# Patient Record
Sex: MALE | Race: WHITE | URBAN - METROPOLITAN AREA
[De-identification: names, ages, dates, MRNs, and addresses within clinical notes are randomized per-mention and may not be internally consistent; named-entity substitution may affect disease eponyms.]

---

## 2017-01-01 ENCOUNTER — TRANSFERRED RECORDS (OUTPATIENT)
Dept: HEALTH INFORMATION MANAGEMENT | Facility: CLINIC | Age: 67
End: 2017-01-01

## 2017-01-01 ENCOUNTER — PRE VISIT (OUTPATIENT)
Dept: UROLOGY | Facility: CLINIC | Age: 67
End: 2017-01-01

## 2017-01-01 ENCOUNTER — PRE VISIT (OUTPATIENT)
Dept: CARDIOLOGY | Facility: CLINIC | Age: 67
End: 2017-01-01

## 2017-01-01 ENCOUNTER — COMMITTEE REVIEW (OUTPATIENT)
Dept: TRANSPLANT | Facility: CLINIC | Age: 67
End: 2017-01-01

## 2017-01-01 ENCOUNTER — OFFICE VISIT (OUTPATIENT)
Dept: SLEEP MEDICINE | Facility: CLINIC | Age: 67
End: 2017-01-01
Attending: INTERNAL MEDICINE
Payer: COMMERCIAL

## 2017-01-01 ENCOUNTER — OFFICE VISIT (OUTPATIENT)
Dept: TRANSPLANT | Facility: CLINIC | Age: 67
End: 2017-01-01
Attending: TRANSPLANT SURGERY
Payer: COMMERCIAL

## 2017-01-01 ENCOUNTER — TELEPHONE (OUTPATIENT)
Dept: TRANSPLANT | Facility: CLINIC | Age: 67
End: 2017-01-01

## 2017-01-01 ENCOUNTER — RADIANT APPOINTMENT (OUTPATIENT)
Dept: GENERAL RADIOLOGY | Facility: CLINIC | Age: 67
End: 2017-01-01
Attending: INTERNAL MEDICINE
Payer: COMMERCIAL

## 2017-01-01 ENCOUNTER — MEDICAL CORRESPONDENCE (OUTPATIENT)
Dept: HEALTH INFORMATION MANAGEMENT | Facility: CLINIC | Age: 67
End: 2017-01-01

## 2017-01-01 ENCOUNTER — RADIANT APPOINTMENT (OUTPATIENT)
Dept: BONE DENSITY | Facility: CLINIC | Age: 67
End: 2017-01-01
Attending: INTERNAL MEDICINE
Payer: COMMERCIAL

## 2017-01-01 ENCOUNTER — APPOINTMENT (OUTPATIENT)
Dept: TRANSPLANT | Facility: CLINIC | Age: 67
End: 2017-01-01
Attending: INTERNAL MEDICINE
Payer: COMMERCIAL

## 2017-01-01 ENCOUNTER — HOSPITAL ENCOUNTER (OUTPATIENT)
Dept: CARDIOLOGY | Facility: CLINIC | Age: 67
Discharge: HOME | End: 2017-12-20
Attending: INTERNAL MEDICINE | Admitting: INTERNAL MEDICINE
Payer: COMMERCIAL

## 2017-01-01 ENCOUNTER — TELEPHONE (OUTPATIENT)
Dept: GASTROENTEROLOGY | Facility: CLINIC | Age: 67
End: 2017-01-01

## 2017-01-01 ENCOUNTER — APPOINTMENT (OUTPATIENT)
Dept: LAB | Facility: CLINIC | Age: 67
End: 2017-01-01
Payer: COMMERCIAL

## 2017-01-01 ENCOUNTER — OFFICE VISIT (OUTPATIENT)
Dept: UROLOGY | Facility: CLINIC | Age: 67
End: 2017-01-01
Payer: COMMERCIAL

## 2017-01-01 ENCOUNTER — OFFICE VISIT (OUTPATIENT)
Dept: CARDIOLOGY | Facility: CLINIC | Age: 67
End: 2017-01-01
Attending: INTERNAL MEDICINE
Payer: COMMERCIAL

## 2017-01-01 ENCOUNTER — MEDICAL CORRESPONDENCE (OUTPATIENT)
Dept: TRANSPLANT | Facility: CLINIC | Age: 67
End: 2017-01-01

## 2017-01-01 ENCOUNTER — DOCUMENTATION ONLY (OUTPATIENT)
Dept: TRANSPLANT | Facility: CLINIC | Age: 67
End: 2017-01-01

## 2017-01-01 ENCOUNTER — OFFICE VISIT (OUTPATIENT)
Dept: GASTROENTEROLOGY | Facility: CLINIC | Age: 67
End: 2017-01-01
Attending: INTERNAL MEDICINE
Payer: COMMERCIAL

## 2017-01-01 VITALS
BODY MASS INDEX: 28.21 KG/M2 | TEMPERATURE: 97.8 F | HEIGHT: 71 IN | DIASTOLIC BLOOD PRESSURE: 78 MMHG | HEART RATE: 70 BPM | RESPIRATION RATE: 16 BRPM | WEIGHT: 201.5 LBS | SYSTOLIC BLOOD PRESSURE: 123 MMHG | OXYGEN SATURATION: 97 %

## 2017-01-01 VITALS
WEIGHT: 224 LBS | BODY MASS INDEX: 31.36 KG/M2 | RESPIRATION RATE: 16 BRPM | OXYGEN SATURATION: 98 % | SYSTOLIC BLOOD PRESSURE: 92 MMHG | DIASTOLIC BLOOD PRESSURE: 54 MMHG | HEART RATE: 70 BPM | HEIGHT: 71 IN

## 2017-01-01 VITALS — WEIGHT: 190 LBS | BODY MASS INDEX: 26.6 KG/M2 | HEIGHT: 71 IN

## 2017-01-01 VITALS — HEART RATE: 74 BPM | SYSTOLIC BLOOD PRESSURE: 118 MMHG | DIASTOLIC BLOOD PRESSURE: 63 MMHG

## 2017-01-01 VITALS
HEIGHT: 71 IN | SYSTOLIC BLOOD PRESSURE: 120 MMHG | WEIGHT: 221.8 LBS | HEART RATE: 70 BPM | BODY MASS INDEX: 31.05 KG/M2 | OXYGEN SATURATION: 98 % | DIASTOLIC BLOOD PRESSURE: 71 MMHG

## 2017-01-01 DIAGNOSIS — K75.81 NONALCOHOLIC STEATOHEPATITIS (NASH): ICD-10-CM

## 2017-01-01 DIAGNOSIS — K74.60 CIRRHOSIS (H): Primary | ICD-10-CM

## 2017-01-01 DIAGNOSIS — K75.81 NASH (NONALCOHOLIC STEATOHEPATITIS): Primary | ICD-10-CM

## 2017-01-01 DIAGNOSIS — R53.1 WEAKNESS: ICD-10-CM

## 2017-01-01 DIAGNOSIS — K74.60 CIRRHOSIS (H): ICD-10-CM

## 2017-01-01 DIAGNOSIS — K74.60 LIVER CIRRHOSIS SECONDARY TO NASH (H): Primary | ICD-10-CM

## 2017-01-01 DIAGNOSIS — K76.6 PORTAL HYPERTENSION (H): ICD-10-CM

## 2017-01-01 DIAGNOSIS — G47.411 NARCOLEPSY WITH CATAPLEXY: ICD-10-CM

## 2017-01-01 DIAGNOSIS — K75.81 LIVER CIRRHOSIS SECONDARY TO NASH (H): Primary | ICD-10-CM

## 2017-01-01 DIAGNOSIS — K76.82 HEPATIC ENCEPHALOPATHY (H): Primary | ICD-10-CM

## 2017-01-01 DIAGNOSIS — K75.81 NONALCOHOLIC STEATOHEPATITIS (NASH): Primary | ICD-10-CM

## 2017-01-01 DIAGNOSIS — C67.8 MALIGNANT NEOPLASM OF OVERLAPPING SITES OF BLADDER (H): Primary | ICD-10-CM

## 2017-01-01 DIAGNOSIS — Z76.82 ORGAN TRANSPLANT CANDIDATE: Primary | ICD-10-CM

## 2017-01-01 DIAGNOSIS — R06.02 SHORTNESS OF BREATH: ICD-10-CM

## 2017-01-01 DIAGNOSIS — K75.81 NASH (NONALCOHOLIC STEATOHEPATITIS): ICD-10-CM

## 2017-01-01 DIAGNOSIS — G47.33 OSA (OBSTRUCTIVE SLEEP APNEA): Primary | ICD-10-CM

## 2017-01-01 LAB
ABO + RH BLD: NORMAL
AFP SERPL-MCNC: 3.3 UG/L (ref 0–8)
AFP SERPL-MCNC: 3.5 UG/L (ref 0–8)
ALBUMIN SERPL-MCNC: 2 G/DL (ref 3.4–5)
ALBUMIN SERPL-MCNC: 2 G/DL (ref 3.4–5)
ALBUMIN UR-MCNC: NEGATIVE MG/DL
ALP SERPL-CCNC: 128 U/L (ref 40–150)
ALP SERPL-CCNC: 152 U/L (ref 40–150)
ALT SERPL W P-5'-P-CCNC: 33 U/L (ref 0–70)
ALT SERPL W P-5'-P-CCNC: 44 U/L (ref 0–70)
ANION GAP SERPL CALCULATED.3IONS-SCNC: 7 MMOL/L (ref 3–14)
ANION GAP SERPL CALCULATED.3IONS-SCNC: 9 MMOL/L (ref 3–14)
APPEARANCE UR: ABNORMAL
AST SERPL W P-5'-P-CCNC: 44 U/L (ref 0–45)
AST SERPL W P-5'-P-CCNC: 46 U/L (ref 0–45)
BILIRUB DIRECT SERPL-MCNC: 0.7 MG/DL (ref 0–0.2)
BILIRUB DIRECT SERPL-MCNC: 0.8 MG/DL (ref 0–0.2)
BILIRUB SERPL-MCNC: 2.9 MG/DL (ref 0.2–1.3)
BILIRUB SERPL-MCNC: 4.3 MG/DL (ref 0.2–1.3)
BILIRUB UR QL STRIP: NEGATIVE
BLD GP AB SCN SERPL QL: NORMAL
BLD GP AB SCN SERPL QL: NORMAL
BLD GP AB SCN TITR SERPL: NORMAL {TITER}
BLOOD BANK CMNT PATIENT-IMP: NORMAL
BLOOD BANK CMNT PATIENT-IMP: NORMAL
BUN SERPL-MCNC: 8 MG/DL (ref 7–30)
BUN SERPL-MCNC: 9 MG/DL (ref 7–30)
CALCIUM SERPL-MCNC: 7.3 MG/DL (ref 8.5–10.1)
CALCIUM SERPL-MCNC: 7.5 MG/DL (ref 8.5–10.1)
CHLORIDE SERPL-SCNC: 111 MMOL/L (ref 94–109)
CHLORIDE SERPL-SCNC: 112 MMOL/L (ref 94–109)
CMV IGG SERPL QL IA: >8 AI (ref 0–0.8)
CO2 SERPL-SCNC: 22 MMOL/L (ref 20–32)
CO2 SERPL-SCNC: 25 MMOL/L (ref 20–32)
COLOR UR AUTO: ABNORMAL
CREAT SERPL-MCNC: 0.88 MG/DL (ref 0.66–1.25)
CREAT SERPL-MCNC: 1.08 MG/DL (ref 0.66–1.25)
CREAT UR-MCNC: 307 MG/DL
DEPRECATED CALCIDIOL+CALCIFEROL SERPL-MC: 8 UG/L (ref 20–75)
EBV VCA IGG SER QL IA: >8 AI (ref 0–0.8)
ERYTHROCYTE [DISTWIDTH] IN BLOOD BY AUTOMATED COUNT: 16.9 % (ref 10–15)
ERYTHROCYTE [DISTWIDTH] IN BLOOD BY AUTOMATED COUNT: 17.8 % (ref 10–15)
ETHYL GLUCURONIDE UR QL: NEGATIVE
GFR SERPL CREATININE-BSD FRML MDRD: 68 ML/MIN/1.7M2
GFR SERPL CREATININE-BSD FRML MDRD: 86 ML/MIN/1.7M2
GLUCOSE SERPL-MCNC: 108 MG/DL (ref 70–99)
GLUCOSE SERPL-MCNC: 108 MG/DL (ref 70–99)
GLUCOSE UR STRIP-MCNC: NEGATIVE MG/DL
HAV IGG SER QL IA: REACTIVE
HBV CORE AB SERPL QL IA: NONREACTIVE
HBV SURFACE AB SERPL IA-ACNC: 0 M[IU]/ML
HBV SURFACE AG SERPL QL IA: NONREACTIVE
HCT VFR BLD AUTO: 28.3 % (ref 40–53)
HCT VFR BLD AUTO: 29.5 % (ref 40–53)
HCV AB SERPL QL IA: NONREACTIVE
HGB BLD-MCNC: 8.8 G/DL (ref 13.3–17.7)
HGB BLD-MCNC: 9.4 G/DL (ref 13.3–17.7)
HGB UR QL STRIP: NEGATIVE
HIV 1+2 AB+HIV1 P24 AG SERPL QL IA: NONREACTIVE
HYALINE CASTS #/AREA URNS LPF: 26 /LPF (ref 0–2)
INR PPP: 1.91 (ref 0.86–1.14)
INR PPP: 1.93 (ref 0.86–1.14)
KETONES UR STRIP-MCNC: NEGATIVE MG/DL
LEUKOCYTE ESTERASE UR QL STRIP: NEGATIVE
M TB TUBERC IFN-G BLD QL: ABNORMAL
M TB TUBERC IFN-G/MITOGEN IGNF BLD: 0 IU/ML
MCH RBC QN AUTO: 32.5 PG (ref 26.5–33)
MCH RBC QN AUTO: 33.1 PG (ref 26.5–33)
MCHC RBC AUTO-ENTMCNC: 31.1 G/DL (ref 31.5–36.5)
MCHC RBC AUTO-ENTMCNC: 31.9 G/DL (ref 31.5–36.5)
MCV RBC AUTO: 104 FL (ref 78–100)
MCV RBC AUTO: 104 FL (ref 78–100)
MUCOUS THREADS #/AREA URNS LPF: PRESENT /LPF
NITRATE UR QL: NEGATIVE
PH UR STRIP: 5 PH (ref 5–7)
PHOSPHATE SERPL-MCNC: 2.5 MG/DL (ref 2.5–4.5)
PLATELET # BLD AUTO: 33 10E9/L (ref 150–450)
PLATELET # BLD AUTO: 37 10E9/L (ref 150–450)
POTASSIUM SERPL-SCNC: 3.6 MMOL/L (ref 3.4–5.3)
POTASSIUM SERPL-SCNC: 3.7 MMOL/L (ref 3.4–5.3)
PROT SERPL-MCNC: 6.4 G/DL (ref 6.8–8.8)
PROT SERPL-MCNC: 6.5 G/DL (ref 6.8–8.8)
PROT UR-MCNC: 0.46 G/L
PROT/CREAT 24H UR: 0.15 G/G CR (ref 0–0.2)
PSA SERPL-ACNC: 0.21 UG/L (ref 0–4)
RBC # BLD AUTO: 2.71 10E12/L (ref 4.4–5.9)
RBC # BLD AUTO: 2.84 10E12/L (ref 4.4–5.9)
RBC #/AREA URNS AUTO: 2 /HPF (ref 0–2)
SODIUM SERPL-SCNC: 142 MMOL/L (ref 133–144)
SODIUM SERPL-SCNC: 143 MMOL/L (ref 133–144)
SOURCE: ABNORMAL
SP GR UR STRIP: 1.02 (ref 1–1.03)
SPECIMEN EXP DATE BLD: NORMAL
SPECIMEN EXP DATE BLD: NORMAL
T PALLIDUM IGG+IGM SER QL: NEGATIVE
TSH SERPL DL<=0.005 MIU/L-ACNC: 1.09 MU/L (ref 0.4–4)
UROBILINOGEN UR STRIP-MCNC: 2 MG/DL (ref 0–2)
WBC # BLD AUTO: 3.5 10E9/L (ref 4–11)
WBC # BLD AUTO: 3.6 10E9/L (ref 4–11)
WBC #/AREA URNS AUTO: 8 /HPF (ref 0–2)

## 2017-01-01 PROCEDURE — 85610 PROTHROMBIN TIME: CPT | Performed by: INTERNAL MEDICINE

## 2017-01-01 PROCEDURE — G0103 PSA SCREENING: HCPCS | Performed by: INTERNAL MEDICINE

## 2017-01-01 PROCEDURE — 86850 RBC ANTIBODY SCREEN: CPT | Performed by: INTERNAL MEDICINE

## 2017-01-01 PROCEDURE — 84100 ASSAY OF PHOSPHORUS: CPT | Performed by: INTERNAL MEDICINE

## 2017-01-01 PROCEDURE — 86644 CMV ANTIBODY: CPT | Performed by: INTERNAL MEDICINE

## 2017-01-01 PROCEDURE — 93018 CV STRESS TEST I&R ONLY: CPT | Performed by: INTERNAL MEDICINE

## 2017-01-01 PROCEDURE — 80076 HEPATIC FUNCTION PANEL: CPT | Performed by: INTERNAL MEDICINE

## 2017-01-01 PROCEDURE — 84156 ASSAY OF PROTEIN URINE: CPT | Performed by: INTERNAL MEDICINE

## 2017-01-01 PROCEDURE — 93016 CV STRESS TEST SUPVJ ONLY: CPT | Performed by: INTERNAL MEDICINE

## 2017-01-01 PROCEDURE — 93321 DOPPLER ECHO F-UP/LMTD STD: CPT | Mod: 26 | Performed by: INTERNAL MEDICINE

## 2017-01-01 PROCEDURE — 86803 HEPATITIS C AB TEST: CPT | Performed by: INTERNAL MEDICINE

## 2017-01-01 PROCEDURE — 86900 BLOOD TYPING SEROLOGIC ABO: CPT | Performed by: INTERNAL MEDICINE

## 2017-01-01 PROCEDURE — 99211 OFF/OP EST MAY X REQ PHY/QHP: CPT | Mod: 27

## 2017-01-01 PROCEDURE — 99203 OFFICE O/P NEW LOW 30 MIN: CPT | Mod: ZP | Performed by: INTERNAL MEDICINE

## 2017-01-01 PROCEDURE — 40000264 ECHO STRESS DOBUTAMINE WITH OPTISON

## 2017-01-01 PROCEDURE — 86780 TREPONEMA PALLIDUM: CPT | Performed by: INTERNAL MEDICINE

## 2017-01-01 PROCEDURE — 86886 COOMBS TEST INDIRECT TITER: CPT | Performed by: INTERNAL MEDICINE

## 2017-01-01 PROCEDURE — 84443 ASSAY THYROID STIM HORMONE: CPT | Performed by: INTERNAL MEDICINE

## 2017-01-01 PROCEDURE — 80321 ALCOHOLS BIOMARKERS 1OR 2: CPT | Performed by: INTERNAL MEDICINE

## 2017-01-01 PROCEDURE — 25000125 ZZHC RX 250: Performed by: INTERNAL MEDICINE

## 2017-01-01 PROCEDURE — 36415 COLL VENOUS BLD VENIPUNCTURE: CPT | Performed by: INTERNAL MEDICINE

## 2017-01-01 PROCEDURE — 86704 HEP B CORE ANTIBODY TOTAL: CPT | Performed by: INTERNAL MEDICINE

## 2017-01-01 PROCEDURE — 87340 HEPATITIS B SURFACE AG IA: CPT | Performed by: INTERNAL MEDICINE

## 2017-01-01 PROCEDURE — 25000128 H RX IP 250 OP 636: Performed by: INTERNAL MEDICINE

## 2017-01-01 PROCEDURE — 80048 BASIC METABOLIC PNL TOTAL CA: CPT | Performed by: INTERNAL MEDICINE

## 2017-01-01 PROCEDURE — 93325 DOPPLER ECHO COLOR FLOW MAPG: CPT | Mod: 26 | Performed by: INTERNAL MEDICINE

## 2017-01-01 PROCEDURE — 86901 BLOOD TYPING SEROLOGIC RH(D): CPT | Performed by: INTERNAL MEDICINE

## 2017-01-01 PROCEDURE — 86706 HEP B SURFACE ANTIBODY: CPT | Performed by: INTERNAL MEDICINE

## 2017-01-01 PROCEDURE — 82105 ALPHA-FETOPROTEIN SERUM: CPT | Performed by: INTERNAL MEDICINE

## 2017-01-01 PROCEDURE — 85027 COMPLETE CBC AUTOMATED: CPT | Performed by: INTERNAL MEDICINE

## 2017-01-01 PROCEDURE — 82306 VITAMIN D 25 HYDROXY: CPT | Performed by: INTERNAL MEDICINE

## 2017-01-01 PROCEDURE — 81001 URINALYSIS AUTO W/SCOPE: CPT | Performed by: INTERNAL MEDICINE

## 2017-01-01 PROCEDURE — 93350 STRESS TTE ONLY: CPT | Mod: 26 | Performed by: INTERNAL MEDICINE

## 2017-01-01 PROCEDURE — 25500064 ZZH RX 255 OP 636: Performed by: INTERNAL MEDICINE

## 2017-01-01 PROCEDURE — 86708 HEPATITIS A ANTIBODY: CPT | Performed by: INTERNAL MEDICINE

## 2017-01-01 PROCEDURE — 86665 EPSTEIN-BARR CAPSID VCA: CPT | Performed by: INTERNAL MEDICINE

## 2017-01-01 PROCEDURE — 99212 OFFICE O/P EST SF 10 MIN: CPT | Mod: ZF

## 2017-01-01 PROCEDURE — 40000866 ZZHCL STATISTIC HIV 1/2 ANTIGEN/ANTIBODY PRETRANSPLANT ONLY: Performed by: INTERNAL MEDICINE

## 2017-01-01 PROCEDURE — 86480 TB TEST CELL IMMUN MEASURE: CPT | Performed by: INTERNAL MEDICINE

## 2017-01-01 RX ORDER — METOPROLOL TARTRATE 1 MG/ML
.5-15 INJECTION, SOLUTION INTRAVENOUS
Status: DISCONTINUED | OUTPATIENT
Start: 2017-01-01 | End: 2017-01-01 | Stop reason: HOSPADM

## 2017-01-01 RX ORDER — DOBUTAMINE HYDROCHLORIDE 200 MG/100ML
5-50 INJECTION INTRAVENOUS CONTINUOUS PRN
Status: COMPLETED | OUTPATIENT
Start: 2017-01-01 | End: 2017-01-01

## 2017-01-01 RX ORDER — ATORVASTATIN CALCIUM 80 MG/1
TABLET, FILM COATED ORAL
COMMUNITY
Start: 2017-01-01

## 2017-01-01 RX ORDER — SELENIUM 50 MCG
TABLET ORAL
COMMUNITY

## 2017-01-01 RX ORDER — ALBUTEROL SULFATE 90 UG/1
2 AEROSOL, METERED RESPIRATORY (INHALATION)
COMMUNITY
Start: 2013-04-08

## 2017-01-01 RX ORDER — LOPERAMIDE HCL 2 MG
2 CAPSULE ORAL PRN
COMMUNITY

## 2017-01-01 RX ORDER — METOPROLOL SUCCINATE 25 MG/1
TABLET, EXTENDED RELEASE ORAL
COMMUNITY
End: 2018-01-01

## 2017-01-01 RX ORDER — BUDESONIDE AND FORMOTEROL FUMARATE DIHYDRATE 160; 4.5 UG/1; UG/1
1 AEROSOL RESPIRATORY (INHALATION)
COMMUNITY

## 2017-01-01 RX ORDER — DIPHENOXYLATE HCL/ATROPINE 2.5-.025MG
TABLET ORAL
COMMUNITY
Start: 2017-01-01

## 2017-01-01 RX ORDER — DICYCLOMINE HYDROCHLORIDE 10 MG/1
10 CAPSULE ORAL
COMMUNITY
Start: 2017-01-01

## 2017-01-01 RX ORDER — FERROUS SULFATE 325(65) MG
325 TABLET ORAL DAILY
COMMUNITY
Start: 2017-01-01

## 2017-01-01 RX ADMIN — METOPROLOL TARTRATE 1.5 MG: 5 INJECTION INTRAVENOUS at 13:04

## 2017-01-01 RX ADMIN — Medication 0.2 MG: at 12:57

## 2017-01-01 RX ADMIN — DOBUTAMINE IN DEXTROSE 30 MCG/KG/MIN: 200 INJECTION, SOLUTION INTRAVENOUS at 12:51

## 2017-01-01 RX ADMIN — HUMAN ALBUMIN MICROSPHERES AND PERFLUTREN 9 ML: 10; .22 INJECTION, SOLUTION INTRAVENOUS at 13:04

## 2017-01-01 ASSESSMENT — ENCOUNTER SYMPTOMS
TREMORS: 0
SWOLLEN GLANDS: 0
JOINT SWELLING: 0
CHILLS: 1
WEAKNESS: 1
VOMITING: 0
STIFFNESS: 1
DYSPNEA ON EXERTION: 1
INSOMNIA: 1
SHORTNESS OF BREATH: 1
BLOOD IN STOOL: 1
NUMBNESS: 0
SEIZURES: 0
DOUBLE VISION: 0
RECTAL PAIN: 0
LEG PAIN: 0
SYNCOPE: 0
PALPITATIONS: 0
HYPOTENSION: 0
MYALGIAS: 1
MUSCLE WEAKNESS: 1
DIZZINESS: 1
HYPERTENSION: 0
BACK PAIN: 1
LOSS OF CONSCIOUSNESS: 0
EYE PAIN: 0
NERVOUS/ANXIOUS: 1
NIGHT SWEATS: 0
NIGHT SWEATS: 0
DECREASED APPETITE: 1
WEIGHT LOSS: 1
ORTHOPNEA: 0
SEIZURES: 0
NECK PAIN: 1
BRUISES/BLEEDS EASILY: 1
DOUBLE VISION: 0
EYE WATERING: 0
NUMBNESS: 0
ABDOMINAL PAIN: 1
ALTERED TEMPERATURE REGULATION: 1
MYALGIAS: 1
STIFFNESS: 1
SWOLLEN GLANDS: 0
HEADACHES: 0
DISTURBANCES IN COORDINATION: 1
PANIC: 0
HYPOTENSION: 0
POSTURAL DYSPNEA: 0
PARALYSIS: 0
DYSPNEA ON EXERTION: 1
BRUISES/BLEEDS EASILY: 1
DIZZINESS: 1
POLYPHAGIA: 0
HALLUCINATIONS: 0
SPUTUM PRODUCTION: 0
DECREASED CONCENTRATION: 1
SNORES LOUDLY: 1
DECREASED CONCENTRATION: 1
MEMORY LOSS: 1
MUSCLE WEAKNESS: 1
NAUSEA: 0
CHILLS: 1
POLYDIPSIA: 0
MEMORY LOSS: 1
SLEEP DISTURBANCES DUE TO BREATHING: 0
DEPRESSION: 1
EYE REDNESS: 0
HYPERTENSION: 0
EYE WATERING: 0
ABDOMINAL PAIN: 1
JAUNDICE: 0
LEG PAIN: 0
BLOATING: 0
WEIGHT LOSS: 1
LIGHT-HEADEDNESS: 0
DIARRHEA: 1
BACK PAIN: 1
DEPRESSION: 1
HEARTBURN: 0
INSOMNIA: 1
DECREASED APPETITE: 1
RECTAL PAIN: 0
FATIGUE: 1
PALPITATIONS: 0
FATIGUE: 1
FEVER: 0
EXERCISE INTOLERANCE: 1
EYE PAIN: 0
SPUTUM PRODUCTION: 0
COUGH DISTURBING SLEEP: 0
MUSCLE CRAMPS: 0
PARALYSIS: 0
POLYDIPSIA: 0
POLYPHAGIA: 0
SYNCOPE: 0
ORTHOPNEA: 0
HEADACHES: 0
BOWEL INCONTINENCE: 0
VOMITING: 0
EYE REDNESS: 0
PANIC: 0
JAUNDICE: 0
ALTERED TEMPERATURE REGULATION: 1
DISTURBANCES IN COORDINATION: 1
BLOATING: 0
SLEEP DISTURBANCES DUE TO BREATHING: 0
TINGLING: 0
EXERCISE INTOLERANCE: 1
ARTHRALGIAS: 1
WEIGHT GAIN: 0
SPEECH CHANGE: 0
HEMOPTYSIS: 0
TREMORS: 0
WEAKNESS: 1
LIGHT-HEADEDNESS: 0
CONSTIPATION: 0
WEIGHT GAIN: 0
EYE IRRITATION: 0
HALLUCINATIONS: 0
SNORES LOUDLY: 1
CONSTIPATION: 0
HEMOPTYSIS: 0
SHORTNESS OF BREATH: 1
DIARRHEA: 1
COUGH DISTURBING SLEEP: 0
NECK PAIN: 1
BOWEL INCONTINENCE: 0
NAUSEA: 0
BLOOD IN STOOL: 1
ARTHRALGIAS: 1
SPEECH CHANGE: 0
FEVER: 0
MUSCLE CRAMPS: 0
EYE IRRITATION: 0
INCREASED ENERGY: 1
POSTURAL DYSPNEA: 0
COUGH: 0
COUGH: 0
WHEEZING: 0
WHEEZING: 0
TINGLING: 0
INCREASED ENERGY: 1
JOINT SWELLING: 0
NERVOUS/ANXIOUS: 1
HEARTBURN: 0
LOSS OF CONSCIOUSNESS: 0

## 2017-01-01 ASSESSMENT — PAIN SCALES - GENERAL
PAINLEVEL: WORST PAIN (10)
PAINLEVEL: WORST PAIN (10)
PAINLEVEL: NO PAIN (0)

## 2017-01-25 ENCOUNTER — TRANSFERRED RECORDS (OUTPATIENT)
Dept: HEALTH INFORMATION MANAGEMENT | Facility: CLINIC | Age: 67
End: 2017-01-25

## 2017-04-14 ENCOUNTER — TRANSFERRED RECORDS (OUTPATIENT)
Dept: HEALTH INFORMATION MANAGEMENT | Facility: CLINIC | Age: 67
End: 2017-04-14

## 2017-05-01 ENCOUNTER — TRANSFERRED RECORDS (OUTPATIENT)
Dept: HEALTH INFORMATION MANAGEMENT | Facility: CLINIC | Age: 67
End: 2017-05-01

## 2017-09-19 NOTE — Clinical Note
Tamia, please schedule for consult 10/17  Svetlana, he has insurance questions, can you touch base with him please?  Thanks D

## 2017-09-19 NOTE — LETTER
2017         Re: Rashi Schroeder  : 1950     Dear Dr. Dodie Beck ,    Thank you for referring your patient, Rashi Schroeder. We are writing to inform you that Mr. Schroeder has completed the initial referral process with us to be considered for a liver transplant at the [facility].    Mr. Schroeder assigned transplant coordinator is Lamar Ho/ Wenceslao Sharpe.  If you should have any questions or concerns, please feel free to call us at [phone] or [phone].  Our regular office hours are Monday - Friday from 8:30am to 5:00pm.    Sincerely,        Liver Transplant Team  [facility]

## 2017-09-19 NOTE — LETTER
LIVER CONSULT   SCHEDULE    Patient:   Rashi Schroeder  MR#:    [id number]  Coordinator:  Wenceslao Sharpe     [phone]  :     Tamia DEL TORO     [phone]  [facility]  Date(s):    October 17, 2017    This is your consult schedule, please follow dates and times.  You will   receive reminder phone calls for other tests, but please follow this schedule  only!  If you have any questions about dates and times, please call us on  number listed above.  Thank you, Transplant Services     *NO FOOD or DRINK AFTER 10:30 P.M.*  (You may only have small amounts of water)      Day/Date:    Tuesday, October 17, 2017  Time Location Activity   6:30 a.m. Imaging and Lab testing  (1st floor [facility] and [facility] ) Blood tests    6:45 a.m. Imaging and Lab testing  ([address] [place] [address] and Surgery Dep[facility] ) Liver Ultrasound with dopplers=NO FOOD or DRINK 8 HOURS BEFORE TEST!!!   7:30 a.m. Transplant Services  ([facility] floor [facility] and [facility]) Review evaluation process & daily schedule   8:00 a.m. Medicine Specialties  (3rd floor [facility] and [facility]) Appointment with Dr. Alas,  Hepatologist   9:00 a.m. Transplant Services  ([address] [place] [address] and Surgery Dep[facility]) Appointment with Magalys Childers         Day/Date:    Every Thursday *OPTIONAL*  Time Location Activity   12:00p.m. - 1:30p.m. Liver Transplant Support Group  Hospital [facility]  Room [phone] Every Thursday *OPTIONAL*     *IF ON LINE CHECK IN IS NOT DONE, YOU WILL NEED TO CHECK IN 15 MINUTES EARLIER THEN THE FIRST SCHEDULED APPOINTMENT*

## 2017-09-22 PROBLEM — K75.81 NASH (NONALCOHOLIC STEATOHEPATITIS): Status: ACTIVE | Noted: 2017-01-01

## 2017-09-22 NOTE — TELEPHONE ENCOUNTER
"Referred by: Dr. Dodie Markham AMALIA Schroeder is a 67 year old male with a history of HUDSON. Diagnosed about a year ago. Unsure how he was diagnosed. Has been having diarrhea for the last few years. Thinks he was being worked up for the diarrhea and they found cirrhosis.     Recently had a \"blood infection\" was on IV antibiotics for 3 weeks. Hospitalized x7 days    Had ascites removed x1, sounds like it was more for diagnostic reasons      Evaluated in [place] this summer. \"everything went well, except I had a personality clash with the doctor.\"   Records show history of non-compliance with medications, and he was denied for transplant    MELD 15 from August labs     Social History  Lives with wife   Has 4 adult sons   Retired, previously worked in [employer]     Past Medical History:   Diagnosis Date     HUDSON (nonalcoholic steatohepatitis) 9/22/2017      Narcolepsy  Chronic diarrhea  Heart attack 3 years ago, stent placed       Surgical History:  Only tells me about bladder cysts      Most recent Colonoscopy/EGD: Scheduled in a few weeks for EGD for follow up varices, colonoscopy done within the year    Plan:  Will start with consult due to lower MELD and denial from [place] due to non-compliance      "

## 2017-09-26 NOTE — TELEPHONE ENCOUNTER
This patient has agreed to attend Liver Consult Appointments on 10/17/17. All appointments have been scheduled and a copy of the Liver Consult Schedule has been sent to the patient via [employer].

## 2017-10-17 NOTE — MR AVS SNAPSHOT
After Visit Summary   10/17/2017    Rashi Schroeder    MRN: [id number]           Patient Information     Date Of Birth          1950        Visit Information        Provider Department      10/17/2017 9:00 AM Magalys Childers LICSW [facility] Solid Organ Transplant        Today's Diagnoses     Organ transplant candidate    -  1       Follow-ups after your visit        Your next 10 appointments already scheduled     Dec 20, 2017  9:30 AM CST   Lab with  LAB   [facility] ([facility])    [address] [place] [address] [id number] [phone]            Dec 20, 2017  9:45 AM CST   Lab with  LAB   [facility] ([facility])    [address] [place] [address] [id number] [phone]            Dec 20, 2017 10:00 AM CST   DX HIP/PELVIS/SPINE with [address] [place] [address] [facility] ([facility])    [address] [place] [address] [id number] [phone]           Please do not take any of the following 24 hours prior to the day of your exam: vitamins, calcium tablets, antacids.  If possible, please wear clothes without metal (snaps, zippers). A sweatsuit works well.            Dec 20, 2017 10:30 AM CST   (Arrive by 10:15 AM)   XR CHEST 2 VIEWS with XR1   [facility] Xray ([facility])    [address] [place] [address] [phone]           Please bring a list of your current medicines to your exam. (Include vitamins, minerals and over-thecounter medicines.) Leave your valuables at home.  Tell your doctor if there is a chance you may be pregnant.  You do not need to do anything special for this exam.            Dec 20, 2017 11:00 AM CST   (Arrive by 10:45 AM)   SOT SOCIAL WORK EVAL with Magalys Childers [facility] Solid Organ Transplant ([facility])    [address] [place] [address]  Street [address]  [address] [place] [id number] [phone]            Dec 20, 2017  1:00 PM CST   Ech Dobutamine Stress Test with [id number]   [facility],  Echocardiography ([facility])    [address] [facility] [id number] [phone]           1.  Please bring or wear a comfortable two-piece outfit and walking shoes. 2.  Stop eating 3 hours before the test. You may drink water or juice. 3.  Stop all caffeine 12 hours before the test. This includes coffee, tea, soda pop, chocolate and certain medicines (such as Anacin and Excederin). Also avoid decaf coffee and tea, as these contain small amounts of caffeine. 4.  No alcohol, smoking or use of other tobacco products for 12 hours before the test. 5.  Refer to your provider instructions to see if you need to stop any medications (such as beta-blockers or nitrates) for this test. 6.  For patients with diabetes: -   If you take insulin, call your diabetes care team. Ask if you should take a   dose the morning of your test. -   If you take diabetes medicine by mouth, don't take it on the morning of your test. Bring it with you to take after the test.  (If you have questions, call your diabetes care team) 7.  When you arrive, please tell us if: -   You have diabetes. -   You have taken Viagra, Cialis or Levitra in the past 48 hours. 8.  For any questions that cannot be answered, please contact the ordering physician            Dec 21, 2017 10:00 AM CST   New Sleep Patient with CHLOE Casanova CNP   [facility], Sleep Study ([facility])    [address] [place] [phone]            Dec 21, 2017  2:30 PM CST   NUTRITION VISIT with OSCAR Castillo [facility] Solid Organ Transplant ([facility])    [address] [phone]            Dec 21, 2017  3:00 PM  CST   (Arrive by 2:45 PM)   NEW LIVER EVALUATION with Heron Harkins MD   [facility] ([facility])    [address] [facility] [address]  3rd Floor  [facility] [address] [phone]            Dec 21, 2017  4:00 PM CST   (Arrive by 3:45 PM)   Bladder Cancer with Rafael Ahuja MD   [facility] for Prostate and Urologic Cancers ([facility])    [address] [facility]  4th Floor  [facility] [address] [phone]              Who to contact     If you have questions or need follow up information about today's clinic visit or your schedule please contact [facility] SOLID ORGAN TRANSPLANT directly at [phone].  Normal or non-critical lab and imaging results will be communicated to you by MyChart, letter or phone within 4 business days after the clinic has received the results. If you do not hear from us within 7 days, please contact the clinic through Preen.Mehart or phone. If you have a critical or abnormal lab result, we will notify you by phone as soon as possible.  Submit refill requests through Aposense or call your pharmacy and they will forward the refill request to us. Please allow 3 business days for your refill to be completed.          Additional Information About Your Visit        Preen.MeharPurpleCow Information     Aposense gives you secure access to your electronic health record. If you see a primary care provider, you can also send messages to your care team and make appointments. If you have questions, please call your primary care clinic.  If you do not have a primary care provider, please call [phone] and they will assist you.        Care EveryWhere ID     This is your Care EveryWhere ID. This could be used by other organizations to access your [facility] medical records  [id number]         Blood Pressure from Last 3 Encounters:   10/17/17 123/78    Weight from Last 3 Encounters:   10/17/17 91.4 kg (201 lb 8 oz)   09/19/17 86.2  kg (190 lb)              Today, you had the following     No orders found for display       Primary Care Provider Office Phone # Fax #    Gume MADDOX Flashshaunjoelle [phone]       [facility] [address] [place] [address]        Equal Access to Services     [facility] : Hadii aad ku hadasho Soomaali, waaxda luqadaha, qaybta kaalmada adeegyada, waxay heikein matn adekira zamora laharveyzofia . So [facility] [phone].    ATENCIÓN: Si habla español, tiene a delgado disposición servicios gratuitos de asistencia lingüística. Llame al [phone].    We comply with applicable federal civil rights laws and [place] laws. We do not discriminate on the basis of race, color, national origin, age, disability, sex, sexual orientation, or gender identity.            Thank you!     Thank you for choosing [facility] SOLID ORGAN TRANSPLANT  for your care. Our goal is always to provide you with excellent care. Hearing back from our patients is one way we can continue to improve our services. Please take a few minutes to complete the written survey that you may receive in the mail after your visit with us. Thank you!             Your Updated Medication List - Protect others around you: Learn how to safely use, store and throw away your medicines at www.disposemymeds.org.          This list is accurate as of: 10/17/17 11:59 PM.  Always use your most recent med list.                   Brand Name Dispense Instructions for use Diagnosis    ACIDOPHILUS LACTOBACILLUS PO      Take 1 capsule by mouth daily        loperamide 2 MG capsule    IMODIUM     Take 2 mg by mouth as needed for diarrhea        METOPROLOL TARTRATE PO      Take 12.5 mg by mouth 2 times daily        PANTOPRAZOLE SODIUM PO      Take 40 mg by mouth every morning (before breakfast)        VENTOLIN IN

## 2017-10-17 NOTE — MR AVS SNAPSHOT
"              After Visit Summary   10/17/2017    Rashi Schroeder    MRN: [id number]           Patient Information     Date Of Birth          1950        Visit Information        Provider Department      10/17/2017 7:30 AM [facility] EVALUATION [facility] Solid Organ Transplant        Today's Diagnoses     HUDSON (nonalcoholic steatohepatitis)    -  1       Follow-ups after your visit        Who to contact     If you have questions or need follow up information about today's clinic visit or your schedule please contact [facility] SOLID ORGAN TRANSPLANT directly at [phone].  Normal or non-critical lab and imaging results will be communicated to you by Stellarrayhart, letter or phone within 4 business days after the clinic has received the results. If you do not hear from us within 7 days, please contact the clinic through Yogiyo or phone. If you have a critical or abnormal lab result, we will notify you by phone as soon as possible.  Submit refill requests through Yogiyo or call your pharmacy and they will forward the refill request to us. Please allow 3 business days for your refill to be completed.          Additional Information About Your Visit        MyChart Information     Yogiyo gives you secure access to your electronic health record. If you see a primary care provider, you can also send messages to your care team and make appointments. If you have questions, please call your primary care clinic.  If you do not have a primary care provider, please call [phone] and they will assist you.        Care EveryWhere ID     This is your Care EveryWhere ID. This could be used by other organizations to access your [facility] medical records  [id number]        Your Vitals Were     Pulse Temperature Respirations Height Pulse Oximetry BMI (Body Mass Index)    70 97.8  F (36.6  C) (Oral) 16 1.803 m (5' 11\") 97% 28.1 kg/m2       Blood Pressure from Last 3 Encounters:   10/17/17 123/78    Weight from Last 3 Encounters: "   10/17/17 91.4 kg (201 lb 8 oz)   09/19/17 86.2 kg (190 lb)              Today, you had the following     No orders found for display       Primary Care Provider Office Phone # Fax #    Gume Armstrong [phone]       [facility] [address] [place] [address]        Equal Access to Services     [facility]: Hadii aad ku hadasho Soomaali, waaxda luqadaha, qaybta kaalmada adeegyada, waxay idiin hayaan adeeg kharash laCharaan . So [facility] [phone].    ATENCIÓN: Si habla español, tiene a delgado disposición servicios gratuitos de asistencia lingüística. Kyleeame al [phone].    We comply with applicable federal civil rights laws and [place] laws. We do not discriminate on the basis of race, color, national origin, age, disability, sex, sexual orientation, or gender identity.            Thank you!     Thank you for choosing [facility] SOLID ORGAN TRANSPLANT  for your care. Our goal is always to provide you with excellent care. Hearing back from our patients is one way we can continue to improve our services. Please take a few minutes to complete the written survey that you may receive in the mail after your visit with us. Thank you!             Your Updated Medication List - Protect others around you: Learn how to safely use, store and throw away your medicines at www.disposemymeds.org.          This list is accurate as of: 10/17/17 11:38 AM.  Always use your most recent med list.                   Brand Name Dispense Instructions for use Diagnosis    ACIDOPHILUS LACTOBACILLUS PO      Take 1 capsule by mouth daily        loperamide 2 MG capsule    IMODIUM     Take 2 mg by mouth as needed for diarrhea        METOPROLOL TARTRATE PO      Take 12.5 mg by mouth 2 times daily        PANTOPRAZOLE SODIUM PO      Take 40 mg by mouth every morning (before breakfast)        VENTOLIN IN

## 2017-10-17 NOTE — PROGRESS NOTES
HPI      ROS      Physical Exam    Assessment and Plan:  1. liver transplant evaluation - patient is a good candidate overall. Benefits and surgical risks of a liver transplantation were discussed.  2.  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 Portal Vein:Patent  2. Hepatic Artery: Open  3. TIPS: absent  4. Previous Abdominal Surgery: Yes; intusseption as a child  5. Hepatocellular Carcinoma: None  6. Ascites: Present - minimal  7. Costal Angle: wide  8. Portopulmonary Hypertension: absent  9. Hepatopulmonary Syndrome: absent  10. Cardiac Evaluation: needs stress echocardiogram  11. Nutritional Status: Good  12. Diabetes: yes, 5 years  13.Hypertension no  14. Smoker:past smoker  115: Fraility index: none      Recommendations: consider living donor      Patients overall evaluation will be discussed at the Liver Transplant selection committee meeting with a final recommendation on the patients suitability for transplant to be made at that time.    Consult Full  Details:  Rashi Schroeder was seen in consultation at the request of Dr. Armstrong  for evaluation as a potential liver transplant recipient.    Reason for Visit:  Rashi Schroeder is a 67 year old year old male with nonalcoholic steatopheatitis, who presents for liver transplant evaluation.    HPI:  Presenting complaint: Abdominal distention    A GI physician noticed that he had evaluation for diarrhea, low platelet counts. Has easy fatigubility. Has ascites and varices banded    Past Medical History:   Diagnosis Date     HUDSON (nonalcoholic steatohepatitis) 9/22/2017     No past surgical history on file.  No past surgical history on file.  No family history on file.  Allergies   Allergen Reactions     Latex      Per patient: Unknown.     Prior to Admission medications    Medication Sig Start Date End Date Taking? Authorizing Provider   METOPROLOL TARTRATE PO Take 12.5 mg by mouth 2 times daily    Reported, Patient    ACIDOPHILUS LACTOBACILLUS PO Take 1 capsule by mouth daily    Reported, Patient   loperamide (IMODIUM) 2 MG capsule Take 2 mg by mouth as needed for diarrhea    Reported, Patient   PANTOPRAZOLE SODIUM PO Take 40 mg by mouth every morning (before breakfast)    Reported, Patient   Albuterol (VENTOLIN IN)     Reported, Patient       Previous Transplant Hx: No    Cardiovascular Hx:       h/o Cardiac Issues: No       Exercise Tolerance: no chest pain or shortness of breath with exertion.    Potential Donor(s): No    ROS:    REVIEW OF SYSTEMS (check box if normal)  [x]                GENERAL  [x]                  PULMONARY [x]                 GENITOURINARY  [x]                 CNS                 [x]                  CARDIAC  [x]                  ENDOCRINE  [x]                 EARS,NOSE,THROAT [x]                  GASTROINTESTINAL [x]                  NEUROLOGIC    [x]                 MUSCLOSKELTAL  [x]                   HEMATOLOGY    Examination:     Vitals:  There were no vitals taken for this visit.    GENERAL APPEARANCE: alert and no distress  EYES: PERRL  HENT: mouth without ulcers or lesions  NECK: supple, no adenopathy  RESP: lungs clear to auscultation - no rales, rhonchi or wheezes  CV: regular rhythm, normal rate, no rub   ABDOMEN:  soft, nontender, no HSM or masses and bowel sounds normal  MS: extremities normal- no gross deformities noted, no evidence of inflammation in joints, no muscle tenderness  SKIN: no rash  NEURO: Normal strength and tone, sensory exam grossly normal, mentation intact and speech normal  PSYCH: mentation appears normal. and affect normal/bright      Results:   Recent Results (from the past 168 hour(s))   Basic metabolic panel    Collection Time: 10/17/17  6:46 AM   Result Value Ref Range    Sodium 143 133 - 144 mmol/L    Potassium 3.7 3.4 - 5.3 mmol/L    Chloride 111 (H) 94 - 109 mmol/L    Carbon Dioxide 25 20 - 32 mmol/L    Anion Gap 7 3 - 14 mmol/L    Glucose 108 (H) 70 - 99 mg/dL     Urea Nitrogen 9 7 - 30 mg/dL    Creatinine 0.88 0.66 - 1.25 mg/dL    GFR Estimate 86 >60 mL/min/1.7m2    GFR Estimate If Black >90 >60 mL/min/1.7m2    Calcium 7.5 (L) 8.5 - 10.1 mg/dL   Hepatic panel    Collection Time: 10/17/17  6:46 AM   Result Value Ref Range    Bilirubin Direct 0.8 (H) 0.0 - 0.2 mg/dL    Bilirubin Total 4.3 (H) 0.2 - 1.3 mg/dL    Albumin 2.0 (L) 3.4 - 5.0 g/dL    Protein Total 6.5 (L) 6.8 - 8.8 g/dL    Alkaline Phosphatase 152 (H) 40 - 150 U/L    ALT 44 0 - 70 U/L    AST 46 (H) 0 - 45 U/L   AFP tumor marker    Collection Time: 10/17/17  6:46 AM   Result Value Ref Range    Alpha Fetoprotein 3.5 0 - 8 ug/L   INR    Collection Time: 10/17/17  6:46 AM   Result Value Ref Range    INR 1.91 (H) 0.86 - 1.14   CBC with platelets    Collection Time: 10/17/17  6:46 AM   Result Value Ref Range    WBC 3.6 (L) 4.0 - 11.0 10e9/L    RBC Count 2.84 (L) 4.4 - 5.9 10e12/L    Hemoglobin 9.4 (L) 13.3 - 17.7 g/dL    Hematocrit 29.5 (L) 40.0 - 53.0 %     (H) 78 - 100 fl    MCH 33.1 (H) 26.5 - 33.0 pg    MCHC 31.9 31.5 - 36.5 g/dL    RDW 16.9 (H) 10.0 - 15.0 %    Platelet Count 33 (LL) 150 - 450 10e9/L   ABO/Rh type and screen    Collection Time: 10/17/17  6:47 AM   Result Value Ref Range    ABO O     RH(D) Pos     Antibody Screen Neg     Test Valid Only At          [facility]    Specimen Expires 10/20/2017      I had a long discussion with the patient regarding liver transplantation which included but was not limited to  the following points:    1. Liver transplant selection committee process.  2. The federal rules for cadaveric waiting list, the size and blood type matching of the organ. The availability of living-related donor transplantation.  3. The types of donors: brain death donors, non-heart beating donors, partial liver grafts: splits and living donor grafts  4. Extended criteria  Donors (older age, steasosis) and the increased  risk of primary non-function  using the extended criteria donors  5. The CDC high risk donors,  Risk of donor transmitted infections and donor transmitted malignancy  6. The liver transplant operation and the associated risks and technical complications which can include intraoperative death, post operative death,  Primary non-function, bleeding requiring re-operations, arterial and biliary complications, bowel perforations, and intra abdominal abscess. Some of these complicaitons may require a second operation.  7. The postoperative course, the ICU stay and risk of postoperative complications which can include sepsis, MI, stroke, brain injury, pneumonia, pleural effusions, and renal dysfunction.  8. The current 1 year and 5 year graft and patient survivals.  9. The need for life long immunosuppressive therapy and the side effects of these medications, including the possibility of toxicity, opportunistic infections, risk of cancer including lymphoma, and the possibility of rejection even if the patient is taking the medication exactly as prescribed.  10. The need for compliance with medications and follow-up visits in the clinic and thereafter.  11. The patient and family understand these risks and wish to proceed to transplantation       I spent 60 minutes with the patient and more than 50% of the time was spend in direct face to face counseling.  CC  LIZ HURD MD    Copy to patient     [address] [facility] [place] [address]

## 2017-10-17 NOTE — MR AVS SNAPSHOT
After Visit Summary   10/17/2017    Rashi Schroeder    MRN: [id number]           Patient Information     Date Of Birth          1950        Visit Information        Provider Department      10/17/2017 9:50 AM Jordon White MD [facility] Solid Organ Transplant        Today's Diagnoses     HUDSON (nonalcoholic steatohepatitis)    -  1       Follow-ups after your visit        Your next 10 appointments already scheduled     Jan 29, 2018  7:45 AM CST   Lab with  LAB   [facility] ([facility])    [address]  [address] [facility] [address] [phone]            Jan 29, 2018  8:40 AM CST   (Arrive by 8:25 AM)   Return Liver Transplant with Felicia Alas MD   [facility]at[facility] ([facility])    [address] [place] [address] [phone]              Who to contact     If you have questions or need follow up information about today's clinic visit or your schedule please contact [facility] SOLID ORGAN TRANSPLANT directly at [phone].  Normal or non-critical lab and imaging results will be communicated to you by [employer], letter or phone within 4 business days after the clinic has received the results. If you do not hear from us within 7 days, please contact the clinic through [employer] or phone. If you have a critical or abnormal lab result, we will notify you by phone as soon as possible.  Submit refill requests through [employer] or call your pharmacy and they will forward the refill request to us. Please allow 3 business days for your refill to be completed.          Additional Information About Your Visit        [employer] Information     [employer] gives you secure access to your electronic health record. If you see a primary care provider, you can also send messages to your care team and make appointments. If you have questions, please call your primary care clinic.  If you do not have a primary care  provider, please call [phone] and they will assist you.        Care EveryWhere ID     This is your Care EveryWhere ID. This could be used by other organizations to access your [place] medical records  [id number]         Blood Pressure from Last 3 Encounters:   No data found for BP    Weight from Last 3 Encounters:   No data found for Wt              Today, you had the following     No orders found for display       Primary Care Provider Office Phone # Fax #    Gume MADDOX Flashshaunjoelle [phone]       [facility] [address] [place] [address]        Equal Access to Services     [facility]: Hadii aad ku hadasho Soomaali, waaxda luqadaha, qaybta kaalmada adeegyamayur, sterling escobar . So [facility] [phone].    ATENCIÓN: Si habla español, tiene a delgado disposición servicios gratuitos de asistencia lingüística. [facility] [phone].    We comply with applicable federal civil rights laws and [place] laws. We do not discriminate on the basis of race, color, national origin, age, disability, sex, sexual orientation, or gender identity.            Thank you!     Thank you for choosing [facility] SOLID ORGAN TRANSPLANT  for your care. Our goal is always to provide you with excellent care. Hearing back from our patients is one way we can continue to improve our services. Please take a few minutes to complete the written survey that you may receive in the mail after your visit with us. Thank you!             Your Updated Medication List - Protect others around you: Learn how to safely use, store and throw away your medicines at www.disposemymeds.org.          This list is accurate as of: 10/17/17 11:59 PM.  Always use your most recent med list.                   Brand Name Dispense Instructions for use Diagnosis    ACIDOPHILUS LACTOBACILLUS PO      Take 1 capsule by mouth daily        loperamide 2 MG capsule    IMODIUM     Take 2 mg by mouth as needed for diarrhea         METOPROLOL TARTRATE PO      Take 12.5 mg by mouth 2 times daily        PANTOPRAZOLE SODIUM PO      Take 40 mg by mouth every morning (before breakfast)        VENTOLIN IN

## 2017-10-17 NOTE — TELEPHONE ENCOUNTER
DATE:  10/17/2017   TIME OF RECEIPT FROM LAB:  8:13  LAB TEST:  Platelet  LAB VALUE:  33  RESULTS GIVEN WITH READ-BACK TO (PROVIDER): Alejandra Hanley LPN for MATT BOYCE  TIME LAB VALUE REPORTED TO PROVIDER:   8:14

## 2017-10-17 NOTE — LETTER
"10/17/2017       RE: Rashi Schroeder  [address] [place] [id number]     Dear Colleague,    Thank you for referring your patient, Rashi Schroeder, to the [facility] HEPATOLOGY at [facility]. Please see a copy of my visit note below.    Hepatology Clinic note  Rashi Schroeder   Date of Birth 1950  Date of Service 10/17/2017    Reason for consult: decompensated liver disease   Requesting provider: Dr. 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 He presents with his wife and daughter-in-law for consult regarding liver transplantation    His cirrhosis is decompensated with a MELD-Na 19, ABO: O     Was evaluated and declined for liver transplant listing by [facility] in [place] due to concerns for non-adherence, he explained it's related to not wanting to take beta blockers due to \"shaking\"  In addition, he informs me that he was told that living donor is not an option based on appearance of imaging, reportedly by Dr. Damon Kirby's, will review outside records.     Overall, he appears to be a reasonable candidate for liver transplantation, possibly living donor based on relatively low MELD-Na and ABO: O.   Will continue evaluation.    Problems list:  - CAD: s/p stenting, needs full evaluation (review outside evaluation) including cardiology consult   - history of Tobacco use: PFTs, imaging  - DM: diet controlled since weight loss  - Diarrhea: x 2 years, had extensive w/u: starting Rifaximin   - Bladder cancer: cystoscopy 2015, resection Dr. Hou ([facility])  - EV: Last EGD : 9/27/2017 banded.   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 He presents with his wife and daughter-in-law for consult regarding liver transplantation.     For the past 1 year, he's been experiencing gradual decline in energy, and low stamina. He was diagnosed with HUDSON cirrhosis based on risk factors including metabolic syndrome conditions, and a sister with HUDSON cirrhosis.   He was evaluated for liver transplantation at [facility] in [place] when his MELD was ~ 19, but appears to have been declined for listing due to concerns about adherence with meds, although he clearly states the meds were causing side effects, including B.blockers, and he's discussed this with his PCP.     DM: when he was overweight, but now sugars: 115-130s not on meds, weight went down from 240s lbs in early 2017. Has CAD with 1 stent, Previous smoker     Was working until 5-6 years ago, but went on disability after fall and back problems.     Otherwise, denies chest pain, s nausea, vomiting fevers, chills, bleeding, confusion, falls, rash, pruritis, leg swelling or abdominal distention. Has stable weight, appetite.         Past Medical History:     Past Medical History:   Diagnosis Date     HUDSON (nonalcoholic steatohepatitis) 9/22/2017   CAD   DM  HLD  RADAMES  Narcolepsy          Past Surgical History:   Vasectomy   Half left knee replacement          Medications:     Current Outpatient Prescriptions   Medication     METOPROLOL TARTRATE PO     ACIDOPHILUS LACTOBACILLUS PO     loperamide (IMODIUM) 2 MG capsule     PANTOPRAZOLE SODIUM PO     Albuterol (VENTOLIN  "IN)     No current facility-administered medications for this visit.             Allergies:     Allergies   Allergen Reactions     Latex      Per patient: Unknown.            Social History:      reports that he has quit smoking. He has never used smokeless tobacco. He reports that he does not drink alcohol or use illicit drugs.   has no sexual activity history on file.           Family History:   3 brothers with different cancer with   A sister with cirrhosis HUDSON         Review of Systems:   10 points ROS was obtained and highlighted in the HPI, otherwise negative.          Physical Exam:   VS:  Vital signs:                         Estimated body mass index is 28.1 kg/(m^2) as calculated from the following:    Height as of an earlier encounter on 10/17/17: 1.803 m (5' 11\").    Weight as of an earlier encounter on 10/17/17: 91.4 kg (201 lb 8 oz).    Gen: A&Ox3, NAD  HEENT: icteric, oropharynx clear  CV: sinus tach  Lung: bibasilar crackles  Abd: soft, NT, ND, positive fluid wave  Ext: trace edema, intact pulses.   Skin: stigmata of chronic liver disease.   Neuro: no focal deficits, grossly intact, no asterixis   Psych: appropriate mood and affects         Data:   Reviewed in person and significant for:  Lab Results   Component Value Date    WBC 3.6 10/17/2017     Lab Results   Component Value Date    RBC 2.84 10/17/2017     Lab Results   Component Value Date    HGB 9.4 10/17/2017     Lab Results   Component Value Date    HCT 29.5 10/17/2017     No components found for: MCT  Lab Results   Component Value Date     10/17/2017     Lab Results   Component Value Date    MCH 33.1 10/17/2017     Lab Results   Component Value Date    MCHC 31.9 10/17/2017     Lab Results   Component Value Date    RDW 16.9 10/17/2017     Lab Results   Component Value Date    PLT 33 10/17/2017     Last Basic Metabolic Panel:  Lab Results   Component Value Date     10/17/2017      Lab Results   Component Value Date    POTASSIUM 3.7 " 10/17/2017     Lab Results   Component Value Date    CHLORIDE 111 10/17/2017     Lab Results   Component Value Date    MARY 7.5 10/17/2017     Lab Results   Component Value Date    CO2 25 10/17/2017     Lab Results   Component Value Date    BUN 9 10/17/2017     Lab Results   Component Value Date    CR 0.88 10/17/2017     Lab Results   Component Value Date     10/17/2017     Liver Function Studies -   Recent Labs   Lab Test  10/17/17   0646   PROTTOTAL  6.5*   ALBUMIN  2.0*   BILITOTAL  4.3*   ALKPHOS  152*   AST  46*   ALT  44       Again, thank you for allowing me to participate in the care of your patient.      Sincerely,    Felicia Alas MD

## 2017-10-17 NOTE — PROGRESS NOTES
Met with Rashi and his family regarding living liver donation.  Provided information and education.  Provided website information and odnor packet.  Answered all questions.

## 2017-10-17 NOTE — PROGRESS NOTES
"Hepatology Clinic note  Rashi Schroeder   Date of Birth 1950  Date of Service 10/17/2017    Reason for consult: decompensated liver disease   Requesting provider: Dr. 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 He presents with his wife and daughter-in-law for consult regarding liver transplantation    His cirrhosis is decompensated with a MELD-Na 19, ABO: O     Was evaluated and declined for liver transplant listing by [facility] in [place] due to concerns for non-adherence, he explained it's related to not wanting to take beta blockers due to \"shaking\"  In addition, he informs me that he was told that living donor is not an option based on appearance of imaging, reportedly by Dr. Damon Kirby's, will review outside records.     Overall, he appears to be a reasonable candidate for liver transplantation, possibly living donor based on relatively low MELD-Na and ABO: O.   Will continue evaluation.    Problems list:  - CAD: s/p stenting, needs full evaluation (review outside evaluation) including cardiology consult   - history of Tobacco use: PFTs, imaging  - DM: diet controlled since weight loss  - Diarrhea: x 2 years, had extensive w/u: starting Rifaximin   - Bladder cancer: cystoscopy 2015, resection Dr. Hou ([facility])  - EV: Last EGD : 9/27/2017 banded.   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 He presents with his wife and daughter-in-law for consult regarding liver transplantation.     For the past 1 year, he's been experiencing gradual decline in energy, and low stamina. He was diagnosed with HUDSON cirrhosis based on risk factors including metabolic syndrome conditions, and a sister with HUDSON cirrhosis.   He was evaluated for liver transplantation at [facility] in [place] when his MELD was ~ 19, but appears to have been declined for listing due to concerns about adherence with meds, although he clearly states the meds were causing side effects, including B.blockers, and he's discussed this with his PCP.     DM: when he was overweight, but now sugars: 115-130s not on meds, weight went down from 240s lbs in early 2017. Has CAD with 1 stent, Previous smoker     Was working until 5-6 years ago, but went on disability after fall and back problems.     Otherwise, denies chest pain, s nausea, vomiting fevers, chills, bleeding, confusion, falls, rash, pruritis, leg swelling or abdominal distention. Has stable weight, appetite.         Past Medical History:     Past Medical History:   Diagnosis Date     HUDSON (nonalcoholic steatohepatitis) 9/22/2017   CAD   DM  HLD  RADAMES  Narcolepsy          Past Surgical History:   Vasectomy   Half left knee replacement          Medications:     Current Outpatient Prescriptions   Medication     METOPROLOL TARTRATE PO     ACIDOPHILUS LACTOBACILLUS PO     loperamide (IMODIUM) 2 MG capsule     PANTOPRAZOLE SODIUM PO     Albuterol (VENTOLIN IN)     No current facility-administered medications for this visit.             Allergies:     Allergies   Allergen Reactions     Latex      Per patient: Unknown.            Social History:      reports that he has quit smoking. He has never used smokeless tobacco.  "He reports that he does not drink alcohol or use illicit drugs.   has no sexual activity history on file.           Family History:   3 brothers with different cancer with   A sister with cirrhosis HUDSON         Review of Systems:   10 points ROS was obtained and highlighted in the HPI, otherwise negative.          Physical Exam:   VS:  Vital signs:                         Estimated body mass index is 28.1 kg/(m^2) as calculated from the following:    Height as of an earlier encounter on 10/17/17: 1.803 m (5' 11\").    Weight as of an earlier encounter on 10/17/17: 91.4 kg (201 lb 8 oz).    Gen: A&Ox3, NAD  HEENT: icteric, oropharynx clear  CV: sinus tach  Lung: bibasilar crackles  Abd: soft, NT, ND, positive fluid wave  Ext: trace edema, intact pulses.   Skin: stigmata of chronic liver disease.   Neuro: no focal deficits, grossly intact, no asterixis   Psych: appropriate mood and affects         Data:   Reviewed in person and significant for:  Lab Results   Component Value Date    WBC 3.6 10/17/2017     Lab Results   Component Value Date    RBC 2.84 10/17/2017     Lab Results   Component Value Date    HGB 9.4 10/17/2017     Lab Results   Component Value Date    HCT 29.5 10/17/2017     No components found for: MCT  Lab Results   Component Value Date     10/17/2017     Lab Results   Component Value Date    MCH 33.1 10/17/2017     Lab Results   Component Value Date    MCHC 31.9 10/17/2017     Lab Results   Component Value Date    RDW 16.9 10/17/2017     Lab Results   Component Value Date    PLT 33 10/17/2017     Last Basic Metabolic Panel:  Lab Results   Component Value Date     10/17/2017      Lab Results   Component Value Date    POTASSIUM 3.7 10/17/2017     Lab Results   Component Value Date    CHLORIDE 111 10/17/2017     Lab Results   Component Value Date    MARY 7.5 10/17/2017     Lab Results   Component Value Date    CO2 25 10/17/2017     Lab Results   Component Value Date    BUN 9 10/17/2017     Lab " Results   Component Value Date    CR 0.88 10/17/2017     Lab Results   Component Value Date     10/17/2017     Liver Function Studies -   Recent Labs   Lab Test  10/17/17   0646   PROTTOTAL  6.5*   ALBUMIN  2.0*   BILITOTAL  4.3*   ALKPHOS  152*   AST  46*   ALT  44

## 2017-10-17 NOTE — LETTER
10/17/2017        RE: Rashi Schroeder  [address] [place] [address]     Dear Colleague,    Thank you for referring your patient, Rashi Schroeder, to the [facility] SOLID ORGAN TRANSPLANT at [facility]. Please see a copy of my visit note below.    HPI      ROS      Physical Exam    Assessment and Plan:  1. liver transplant evaluation - patient is a good candidate overall. Benefits and surgical risks of a liver transplantation were discussed.  2.  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 Portal Vein:Patent  2. Hepatic Artery: Open  3. TIPS: absent  4. Previous Abdominal Surgery: Yes; intusseption as a child  5. Hepatocellular Carcinoma: None  6. Ascites: Present - minimal  7. Costal Angle: wide  8. Portopulmonary Hypertension: absent  9. Hepatopulmonary Syndrome: absent  10. Cardiac Evaluation: needs stress echocardiogram  11. Nutritional Status: Good  12. Diabetes: yes, 5 years  13.Hypertension no  14. Smoker:past smoker  115: Fraility index: none      Recommendations: consider living donor      Patients overall evaluation will be discussed at the Liver Transplant selection committee meeting with a final recommendation on the patients suitability for transplant to be made at that time.    Consult Full  Details:  Rashi Schroeder was seen in consultation at the request of Dr. Armstrong  for evaluation as a potential liver transplant recipient.    Reason for Visit:  Rashi Schroeder is a 67 year old year old male with nonalcoholic steatopheatitis, who presents for liver transplant evaluation.    HPI:  Presenting complaint: Abdominal distention    A GI physician noticed that he had evaluation for diarrhea, low platelet counts. Has easy fatigubility. Has ascites and varices banded    Past Medical History:   Diagnosis Date     HUDSON (nonalcoholic steatohepatitis) 9/22/2017     No past surgical history on file.  No past surgical history on  file.  No family history on file.  Allergies   Allergen Reactions     Latex      Per patient: Unknown.     Prior to Admission medications    Medication Sig Start Date End Date Taking? Authorizing Provider   METOPROLOL TARTRATE PO Take 12.5 mg by mouth 2 times daily    Reported, Patient   ACIDOPHILUS LACTOBACILLUS PO Take 1 capsule by mouth daily    Reported, Patient   loperamide (IMODIUM) 2 MG capsule Take 2 mg by mouth as needed for diarrhea    Reported, Patient   PANTOPRAZOLE SODIUM PO Take 40 mg by mouth every morning (before breakfast)    Reported, Patient   Albuterol (VENTOLIN IN)     Reported, Patient       Previous Transplant Hx: No    Cardiovascular Hx:       h/o Cardiac Issues: No       Exercise Tolerance: no chest pain or shortness of breath with exertion.    Potential Donor(s): No    ROS:    REVIEW OF SYSTEMS (check box if normal)  [x]                GENERAL  [x]                  PULMONARY [x]                 GENITOURINARY  [x]                 CNS                 [x]                  CARDIAC  [x]                  ENDOCRINE  [x]                 EARS,NOSE,THROAT [x]                  GASTROINTESTINAL [x]                  NEUROLOGIC    [x]                 MUSCLOSKELTAL  [x]                   HEMATOLOGY    Examination:     Vitals:  There were no vitals taken for this visit.    GENERAL APPEARANCE: alert and no distress  EYES: PERRL  HENT: mouth without ulcers or lesions  NECK: supple, no adenopathy  RESP: lungs clear to auscultation - no rales, rhonchi or wheezes  CV: regular rhythm, normal rate, no rub   ABDOMEN:  soft, nontender, no HSM or masses and bowel sounds normal  MS: extremities normal- no gross deformities noted, no evidence of inflammation in joints, no muscle tenderness  SKIN: no rash  NEURO: Normal strength and tone, sensory exam grossly normal, mentation intact and speech normal  PSYCH: mentation appears normal. and affect normal/bright      Results:   Recent Results (from the past 168 hour(s))    Basic metabolic panel    Collection Time: 10/17/17  6:46 AM   Result Value Ref Range    Sodium 143 133 - 144 mmol/L    Potassium 3.7 3.4 - 5.3 mmol/L    Chloride 111 (H) 94 - 109 mmol/L    Carbon Dioxide 25 20 - 32 mmol/L    Anion Gap 7 3 - 14 mmol/L    Glucose 108 (H) 70 - 99 mg/dL    Urea Nitrogen 9 7 - 30 mg/dL    Creatinine 0.88 0.66 - 1.25 mg/dL    GFR Estimate 86 >60 mL/min/1.7m2    GFR Estimate If Black >90 >60 mL/min/1.7m2    Calcium 7.5 (L) 8.5 - 10.1 mg/dL   Hepatic panel    Collection Time: 10/17/17  6:46 AM   Result Value Ref Range    Bilirubin Direct 0.8 (H) 0.0 - 0.2 mg/dL    Bilirubin Total 4.3 (H) 0.2 - 1.3 mg/dL    Albumin 2.0 (L) 3.4 - 5.0 g/dL    Protein Total 6.5 (L) 6.8 - 8.8 g/dL    Alkaline Phosphatase 152 (H) 40 - 150 U/L    ALT 44 0 - 70 U/L    AST 46 (H) 0 - 45 U/L   AFP tumor marker    Collection Time: 10/17/17  6:46 AM   Result Value Ref Range    Alpha Fetoprotein 3.5 0 - 8 ug/L   INR    Collection Time: 10/17/17  6:46 AM   Result Value Ref Range    INR 1.91 (H) 0.86 - 1.14   CBC with platelets    Collection Time: 10/17/17  6:46 AM   Result Value Ref Range    WBC 3.6 (L) 4.0 - 11.0 10e9/L    RBC Count 2.84 (L) 4.4 - 5.9 10e12/L    Hemoglobin 9.4 (L) 13.3 - 17.7 g/dL    Hematocrit 29.5 (L) 40.0 - 53.0 %     (H) 78 - 100 fl    MCH 33.1 (H) 26.5 - 33.0 pg    MCHC 31.9 31.5 - 36.5 g/dL    RDW 16.9 (H) 10.0 - 15.0 %    Platelet Count 33 (LL) 150 - 450 10e9/L   ABO/Rh type and screen    Collection Time: 10/17/17  6:47 AM   Result Value Ref Range    ABO O     RH(D) Pos     Antibody Screen Neg     Test Valid Only At          [facility]    Specimen Expires 10/20/2017      I had a long discussion with the patient regarding liver transplantation which included but was not limited to  the following points:    1. Liver transplant selection committee process.  2. The federal rules for cadaveric waiting list, the size and blood type matching of the  organ. The availability of living-related donor transplantation.  3. The types of donors: brain death donors, non-heart beating donors, partial liver grafts: splits and living donor grafts  4. Extended criteria  Donors (older age, steasosis) and the increased  risk of primary non-function using the extended criteria donors  5. The CDC high risk donors,  Risk of donor transmitted infections and donor transmitted malignancy  6. The liver transplant operation and the associated risks and technical complications which can include intraoperative death, post operative death,  Primary non-function, bleeding requiring re-operations, arterial and biliary complications, bowel perforations, and intra abdominal abscess. Some of these complicaitons may require a second operation.  7. The postoperative course, the ICU stay and risk of postoperative complications which can include sepsis, MI, stroke, brain injury, pneumonia, pleural effusions, and renal dysfunction.  8. The current 1 year and 5 year graft and patient survivals.  9. The need for life long immunosuppressive therapy and the side effects of these medications, including the possibility of toxicity, opportunistic infections, risk of cancer including lymphoma, and the possibility of rejection even if the patient is taking the medication exactly as prescribed.  10. The need for compliance with medications and follow-up visits in the clinic and thereafter.  11. The patient and family understand these risks and wish to proceed to transplantation     I spent 60 minutes with the patient and more than 50% of the time was spend in direct face to face counseling.    Sincerely,    MD DARIANA Malone MICHAEL C MD    Copy to patient   [address] [place] [address]

## 2017-10-17 NOTE — COMMITTEE REVIEW
Abdominal Patient Discussion Note Transplant Coordinator: Caitlyn Wang  Transplant Surgeon:   Jordon White      Committee Review Members:  Nutrition Magi Baez, OSCAR    - Clinical EVA Clark, Magalys Childers, [facility]   Transplant Felicia Alas MD, Dany Hou MD, Jr Abhilash Sharpe, YANET, Tamia Hampton MD, Walt Tripp MD, Lillian Dickerson, APRN CNP, Caitlyn Wang, RN, Dhara Gomez MD, Juancarlos Serrano MD, Kia Garcia, YANET, Jordon White MD, Shailesh Gipson MD, Otto Waldrop MD       Additional Discussion Notes and Findings:   Good candidate for living donor  Re-discuss pending full eval  Needs neurology- discuss narcolepsy and RADAMES  Cardiac clearance

## 2017-10-17 NOTE — MR AVS SNAPSHOT
After Visit Summary   10/17/2017    Rashi Schroeder    MRN: [id number]           Patient Information     Date Of Birth          1950        Visit Information        Provider Department      10/17/2017 8:00 AM Felicia Alas MD [facility] Hepatology        Today's Diagnoses     Liver cirrhosis secondary to HUDSON (H)    -  1       Follow-ups after your visit        Your next 10 appointments already scheduled     Jan 29, 2018  7:45 AM CST   Lab with  LAB   [facility] ([facility])    [address]  1st [facility] [address] [phone]            Jan 29, 2018  8:40 AM CST   (Arrive by 8:25 AM)   Return Liver Transplant with Felicia Alas MD   [facility] Hepatology ([facility])    [address]  3rd Floor  [facility] [address] [phone]              Who to contact     If you have questions or need follow up information about today's clinic visit or your schedule please contact [facility] HEPATOLOG[facility] directly at [phone].  Normal or non-critical lab and imaging results will be communicated to you by Zigswitchhart, letter or phone within 4 business days after the clinic has received the results. If you do not hear from us within 7 days, please contact the clinic through CleanSlatet or phone. If you have a critical or abnormal lab result, we will notify you by phone as soon as possible.  Submit refill requests through MedPlasts or call your pharmacy and they will forward the refill request to us. Please allow 3 business days for your refill to be completed.          Additional Information About Your Visit        Zigswitchhart Information     MedPlasts gives you secure access to your electronic health record. If you see a primary care provider, you can also send messages to your care team and make appointments. If you have questions, please call your primary care clinic.  If you do not have a primary care provider, please call [phone]  and they will assist you.        Care EveryWhere ID     This is your Care EveryWhere ID. This could be used by other organizations to access your [facility] medical records  [id number]         Blood Pressure from Last 3 Encounters:   10/17/17 123/78    Weight from Last 3 Encounters:   10/17/17 91.4 kg (201 lb 8 oz)   09/19/17 86.2 kg (190 lb)              Today, you had the following     No orders found for display       Primary Care Provider Office Phone # Fax #    Gume MADDOX Nathaniel [phone]       [facility] [address] [place] [address]        Equal Access to Services     [facility]: Hadii aad ku hadasho Soomaali, waaxda luqadaha, qaybta kaalmada adeegyada, sterling escobar . So [facility] [phone].    ATENCIÓN: Si habla español, tiene a delgado disposición servicios gratuitos de asistencia lingüística. Ll[facility] [phone].    We comply with applicable federal civil rights laws and [place] laws. We do not discriminate on the basis of race, color, national origin, age, disability, sex, sexual orientation, or gender identity.            Thank you!     Thank you for choosing [facility] HEPATOLOGY  for your care. Our goal is always to provide you with excellent care. Hearing back from our patients is one way we can continue to improve our services. Please take a few minutes to complete the written survey that you may receive in the mail after your visit with us. Thank you!             Your Updated Medication List - Protect others around you: Learn how to safely use, store and throw away your medicines at www.disposemymeds.org.          This list is accurate as of: 10/17/17 11:59 PM.  Always use your most recent med list.                   Brand Name Dispense Instructions for use Diagnosis    ACIDOPHILUS LACTOBACILLUS PO      Take 1 capsule by mouth daily        loperamide 2 MG capsule    IMODIUM     Take 2 mg by mouth as needed for diarrhea        METOPROLOL  TARTRATE PO      Take 12.5 mg by mouth 2 times daily        PANTOPRAZOLE SODIUM PO      Take 40 mg by mouth every morning (before breakfast)        VENTOLIN IN

## 2017-10-17 NOTE — LETTER
10/17/2017       RE: Rashi Schroeder  [address] [place] [address]     Dear Colleague,    Thank you for referring your patient, Rashi Schroeder, to the [facility] SOLID ORGAN TRANSPLANT at [facility]. Please see a copy of my visit note below.    Patient attended all appointments and completed all scheduled tests.  Patient to follow up with Transplant Coordinator.  Patient stated understanding and has contact numbers.      Again, thank you for allowing me to participate in the care of your patient.      Sincerely,    Transplant Evaluation Resource

## 2017-10-18 NOTE — Clinical Note
He does not have to come back on referral days since he already has seen the GI and surgeon. Thanks.

## 2017-10-18 NOTE — TELEPHONE ENCOUNTER
Briefly spoke with Migue, his wife, and daughter in law in clinic yesterday. Left message today asking that they call if they have any questions or concerns about their experience or plan. Will be getting a call and schedule to continue with his transplant evaluation.

## 2017-11-03 NOTE — TELEPHONE ENCOUNTER
Left message asking to inform this of preferred pharmacy for Rx. Can comminute back by phone or medical message.

## 2017-11-14 NOTE — LETTER
December 12, 2017    LIVER TRANSPLANT APPOINTMENT SCHEDULE    Patient:   Rashi Schroeder  MR#:    [id number]  Coordinator:  Caitlyn Wang  [phone]  :     Parris GAMEZ   [phone]  Location:    [facility] Center  Date(s):   December 20, December 21, 2017 and  February 12, 2018      Do not take calcium pills or supplements 48 hours before the DEXA scan    No caffeine for 12 hours prior to your appointment    If you take beta blockers, do not take them on Tuesday; you may take them after the Dobutamine stress echo is done on Wednesday at 2:00pm    No food or drink after midnight for labs AND    No food or drink three (3) hours before the stress echo at 1:00 pm (no eating after 10:00 am)    Day/Date:  Wednesday, December 20, 2017  Time Location Activity   9:30 am Imaging and Lab testing  (1st floor [facility],  [address]; [place] [address]) Blood and Urine Labs  *NO FOOD OR DRINK AFTER MIDNIGHT*   9:45 am Imaging and Lab testing  (1st floor [facility]) Blood tests (2nd ABO)   10:00 am Imaging and Lab Testing  (1st floor [facility]) Dexa Scan    DO NOT TAKE CALCIUM PILLS OR SUPPLEMENTS 48 HOURS BEFORE THE TEST   10:30 am Imaging and Lab testing  (1st floor [facility]) Chest X-ray    11:00 am Transplant Services  (3rd floor [facility]) Appointment with Magalys Childers     12:30 pm [employer] [facility] - 1st Floor/Entrance  [facility]  Take the [facility] to the Hospital  (The shuttle is white with maroon letters)   1:00 pm Gold Waiting Room  (2nd floor [facility],  [address], [place] [address]) Dobutamine Stress Echo    SEE ENCLOSED INSTRUCTIONS for TEST!    NO FOOD OR DRINK THREE (3) HOURS BEFORE TEST    NO CAFFEINE/ALCOHOL TWELVE (12) HRS BEFORE TEST    NO BETA BLOCKERS THE DAY BEFORE OR DAY OF THE TEST     * IF ON LINE CHECK IN IS NOT DONE, YOU WILL NEED TO CHECK  IN 15 MINUTES EARLIER THEN THE FIRST SCHEDULED APPOINTMENT *      Day/Date:  Thursday, December 21, 2017  Time Location Activity   10:00 am [facility]  [address] [place]  [address] Appointment with CHLOE Macias, CNP   11:30 am [facility] - 2nd Floor/Entrance  [facility] Take [facility] to either the Hospital or the Clinics & Surgery Ctr   *OPTIONAL*  12:00 pm to 1:30 pm Liver Transplant Support Group  [address]; [facility] [address]  Hospital Station 7B  Room 7-120 Every Thursday *OPTIONAL*   1:00 pm [facility] - 2nd Floor/Entrance  [facility] Take [facility] to Clinics & Surgery Center   1:30 pm Transplant Services  (3rd floor [facility],  [address]  [address]) Private Pre-transplant class with Caitlyn Wang, Transplant Coordinator   2:30 pm Transplant Services  (3rd floor [facility]) Appointment with Riana Baez,  Registered Dietitian   3:00 pm [facility]  (3rd floor [facility]) Appointment with Dr. Harkins,  Cardiology   4:00 pm Urology and Prostate Clinic  (4th floor [facility]) Appointment with Dr. Ahuja,  Urologist       Day/Date:  Monday, February 12, 2018  Time Location Activity   8:30 am Laboratory and Imaging  (1st floor [facility],  [address] [facility], [facility]  [address]) Blood Labs   9:20 am Medicine Specialties  (3rd floor [facility]) Appointment with Dr. Alas,  Hepatologist

## 2017-11-14 NOTE — TELEPHONE ENCOUNTER
November 14, 2017: Carleen called back and asked me to pencil in doctor appointments on the dates I suggested (12/20 and 12/21).    She would like to speak with Migue Samson's coordinator, and I transferred her call.    Parris Santiago  Pre-Liver Transplant   [phone]

## 2017-11-14 NOTE — TELEPHONE ENCOUNTER
I left a message for Carleen to discuss timing of the appointments that Caitlyn, his coordinator, placed orders for.    In order to fit everything in, we would need to schedule a two-day visit on Wednesday, 12/20 and Thursday, 12/21 (driving in on Tuesday, 12/19).    Parris Valle  Pre-Liver Transplant   [phone]      Caitlyn Wang, Parris Huynh         ----- Message -----      From: Caitlyn Wang RN      Sent: 10/18/2017  11:22 AM        To: Parris Valle     He does not have to come back on referral days since he already has seen the GI and surgeon.   Thanks.                    Please add urology to the rest of his eval orders.   Thanks            Orders Only for Transplant Evaluation  10/18/2017       Caitlyn Wang, RN - [facility] Encounter Summary       Diagnoses       Cirrhosis (h) (Primary)       Nonalcoholic steatohepatitis (HUDSON); Portal hypertension (H)                Orders Signed This Encounter (33)      UROLOGY ADULT REFERRAL        Hepatitis C antibody        NEUROLOGY ADULT REFERRAL        UA with Microscopic reflex to Culture        Anti Treponema        HIV Antigen Antibody Combo Pretransplant        Hepatitis B surface antigen        Hepatitis B Surface Antibody        Hepatitis B core antibody        Hepatitis A Antibody IgG        EBV Capsid Antibody IgG        CMV Antibody IgG        Protein  random urine with Creat Ratio        Routine UA with microscopic        CBC with platelets        INR        Vitamin D Deficiency        TSH with free T4 reflex        Prostate spec antigen screen        Phosphorus        M Tuberculosis by Quantiferon        AFP tumor marker        Hepatic panel        Basic metabolic panel        Antibody titer red cell        ABO/Rh type and screen        Echo dobutamine stress test        Dexa hip/pelvis/spine [VPD5302]        X-ray Chest 2 vws [IMG36]        NUTRITION REFERRAL        CARDIOLOGY EVAL ADULT REFERRAL          REFERRAL        Pre-Transplant Class

## 2017-11-14 NOTE — LETTER
December 12, 2017    LIVER TRANSPLANT APPOINTMENT SCHEDULE    Patient:   Rashi Schroeder  MR#:    [id number]  Coordinator:  Caitlyn Wang  [phone]  :     Parris GAMEZ   [phone]  Location:    [facility]  Date(s):   December 21, 2017 and February 12, 2018    Day/Date:  Thursday, December 21, 2017  Time Location Activity   10:00 am [place] [facility]  [address] [place] [address]  [address] Appointment with CHLOE Macias, CNP   11:00 am [facility] - 2nd Floor/Entrance  [facility] Take [facility] to [facility] Center   2:30 pm Transplant Services  (3rd floor [facility],  [address] [facility], [facility]  [address]) Appointment with Riana Baez,  Registered Dietitian   3:00 pm [facility]  (3rd floor [facility]) Appointment with Dr. Harkins,  Cardiology   4:00 pm Urology and Prostate Clinic  (4th floor [facility]) Appointment with Dr. Ahuja,  Urologist     * IF ON LINE CHECK IN IS NOT DONE, YOU WILL NEED TO CHECK IN 15 MINUTES EARLIER THEN THE FIRST SCHEDULED APPOINTMENT *      Day/Date:  Monday, February 12, 2018  Time Location Activity   8:30 am Laboratory and Imaging  (1st floor [facility]) Blood Labs   9:20 am Medicine Specialties  (3rd floor [facility]) Appointment with Dr. Alas,  Hepatologist     Day/Date:  Every Thursday *OPTIONAL*  Time Location Activity   12:00 pm to 1:30 pm Liver Transplant Support Group  [address]; [facility] [address]  Hospital Station 7B  Room 7-120 Every Thursday *OPTIONAL*

## 2017-12-18 NOTE — PROGRESS NOTES
Psychosocial Assessment  Rashi Schroeder was seen in the Transplant Center as part of his consultation as a potential liver transplant recipient.  He was accompanied by his wife and daughter-in-law, Svetlana Alexandre, who is an RN.   Living Situation: Rashi Schroeder (Frank) is a [age] year old man who lives in [place], [place] with his wife of forty years, Carleen. One of their adult sons also lives with them off and on. Their house is bi-level. Their son and daughter-in-law live in [place], [place]. They are able to stay there when here for medical appointments/care.   Education/Employment:  Mr. Schroeder completed high school and some business classes. 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 He has not been able to work for the past five years, due to a broken back and leg.   Financial /Income: Mr. Schroeder is receiving Social Security intermediate benefits. His wife currently works as a  at a [employer] school office.   Health Insurance:  Mr. Schroeder has BCBS insurance via his wife's employer. She plans on retiring some time next year, so this coverage would end. He also has Medicare Part A only. Education was provided about Medicare care coverage for transplant, including Part B coverage for immunosuppression. I also provided them with the telephone number for our liver transplant financial counselor, Svetlana. This writer talked with Migue and Carleen about the financial risks of transplant, particularly about the high cost of transplant related medications and the importance of maintaining adequate health insurance coverage.  Family/Social Support: Mr. Schroeder's main support person is his wife. She has summer's off and sick leave she could use when needed. Mr. Schroeder has four sons, one from a previous relationship.  His son and daughter-in-law, who live in [place], are helpful and available for care while in [place]. Other sons live in [place], [place] and [place], [place]. Mr. Schroeder has six siblings,  three of whom are . One of his sisters has HUDSON, his oldest has Alzheimer's and the other drinks. 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 He is an [employer].   Mr. Schroeder does not have an advance directive, but he is working on completing one.  This writer stressed the importance of having a stable and involved support network before and after transplant.  Provided Rashi  with education about the relationship between a stable support system and better surgical and post-transplant outcomes compared to patients with a limited support system.    Functional Status: As previously stated,  is unable to work due to his chronic back pain. He also is unable to drive, reportedly due to narcolepsy and falling asleep at unpredictable times.   Chemical Dependency:  Mr. Schroeder stated that he was never much of a drinker. At the most, he would consume about two beers at a time, but really doesn't like the taste. He has consumed no alcohol for over a year. He also used to smoke cigarettes, but quit about twenty-five years ago.   Mental Health: Mr. Schroeder has no reported history of mental health concerns.   Adjustment to Illness:  Mr. Schroeder reports experiencing some fear related to his current illness. In addition to his liver disease (HUDSON), he also has diabetes, previous bladder cancer and earlier this summer was septic, requiring weeks of IV antibiotics, which his wife was able to give him at home.  This writer provided Rashi  with supportive counseling throughout this interview.  This writer also encouraged Migue and his family to attend the liver transplant support group for additional support and encouragement.   Impression/Recommendations:   Migue and his family verbalized understanding the psychosocial risks of transplant and teaching provided during this evaluation. He has a good support system. His wife and daughter-in-law asked good questions, and are willing/able to fill the post transplant  caregiver role. His daughter-in-law is an RN at [employer] and [employer]. Finances are adequate for transplantation. He may benefit from obtaining Medicare Part B, as it would cover 80% of the immunosuppression cost not covered by his BCBS policy. They were referred to our transplant financial counselor for additional education related to this.   Mr. Schroeder fell asleep during part of our interview, with his family fully participating. He did appear to perk up more when discussing topics of greater interest or more comfortable to him. He may benefit from a sleep study to further assess his narcolepsy vs encephalopathy.   There was concern at [place] about his adherence to medical care recommendations. He and his wife reported issues related to medications prescribed and his reaction to them, as a reason for non adherence. He reported his plan to take post transplant medications as prescribed. Mr. Schroeder currently has no psychosocial barriers to transplantation, should he meet medical criteria. I will be happy to see him again for an updated psychosocial assessment, should he return for a full transplant evaluation.      Teaching completed during assessment:  1.     Housing and relocation needs post transplant.  2.     Caregiver needs post transplant.  3.     Financial issues related to transplant.  4.     Risks of alcohol use post transplant.  5.     Common psychosocial stressors pre/post transplant.        6.     Liver Transplant support group availability.        7.     Advanced Health Care Directive             Psychosocial Risks of Transplant Reviewed:  1.     Increased stress related to your emotional, family, social, employment, or   financial situation.  2.     Affect on work and/or disability benefits.  3.     Affect on future health and life insurance.  4.     Transplant outcome expectations may not be met.  5.     Mental Health risks: anxiety, depression, PTSD, guilt, grief and chronic fatigue.      EVA Pennington, LICSW

## 2017-12-18 NOTE — NURSING NOTE
New patient consult for bladder cancer.  Records scanned into EPIC- also available in Care Everywhere.

## 2017-12-20 NOTE — MR AVS SNAPSHOT
After Visit Summary   12/20/2017    Rashi Schroeder    MRN: [id number]           Patient Information     Date Of Birth          1950        Visit Information        Provider Department      12/20/2017 11:00 AM Magalys Childers, KYLEE [facility] Solid Organ Transplant        Today's Diagnoses     Organ transplant candidate    -  1       Follow-ups after your visit        Your next 10 appointments already scheduled     Feb 11, 2018  8:00 PM CST   PSG Split with SLEEP STUDY  6   [facility] [place], Sleep Study ([facility])    [address] [place] [id number] [phone]            Feb 12, 2018  8:30 AM CST   Lab with  LAB   [facility] Lab ([facility])    [address]  1st Floor  [facility] [address] [phone]            Feb 12, 2018  9:20 AM CST   (Arrive by 9:05 AM)   Return Liver Transplant with Felicia Alas MD   [facility] Hepatology ([facility])    [address]  [address]  [facility] [address] [phone]              Who to contact     If you have questions or need follow up information about today's clinic visit or your schedule please contact [facility] SOLID ORGAN TRANSPLANT directly at [phone].  Normal or non-critical lab and imaging results will be communicated to you by MyChart, letter or phone within 4 business days after the clinic has received the results. If you do not hear from us within 7 days, please contact the clinic through MyChart or phone. If you have a critical or abnormal lab result, we will notify you by phone as soon as possible.  Submit refill requests through [employer] or call your pharmacy and they will forward the refill request to us. Please allow 3 business days for your refill to be completed.          Additional Information About Your Visit        [employer] Information     [employer] gives you secure access to your electronic  health record. If you see a primary care provider, you can also send messages to your care team and make appointments. If you have questions, please call your primary care clinic.  If you do not have a primary care provider, please call [phone] and they will assist you.        Care EveryWhere ID     This is your Care EveryWhere ID. This could be used by other organizations to access your [facility] medical records  [id number]         Blood Pressure from Last 3 Encounters:   12/21/17 118/63   12/21/17 120/71   12/21/17 92/54    Weight from Last 3 Encounters:   12/21/17 100.6 kg (221 lb 12.8 oz)   12/21/17 101.6 kg (224 lb)   10/17/17 91.4 kg (201 lb 8 oz)              Today, you had the following     No orders found for display       Primary Care Provider Office Phone # Fax #    KyleVARSHA Armstrong [phone]       [facility] [address] [place] [address]        Equal Access to Services     [facility]: Hadii aad ku hadasho Soomaali, waaxda luqadaha, qaybta kaalmada adeegyada, waxay idiin hayadrienn geovany escobar . So [facility] [phone].    ATENCIÓN: Si habla español, tiene a delgado disposición servicios gratuitos de asistencia lingüística. [facility] [phone].    We comply with applicable federal civil rights laws and [place] laws. We do not discriminate on the basis of race, color, national origin, age, disability, sex, sexual orientation, or gender identity.            Thank you!     Thank you for choosing [facility] SOLID ORGAN TRANSPLANT  for your care. Our goal is always to provide you with excellent care. Hearing back from our patients is one way we can continue to improve our services. Please take a few minutes to complete the written survey that you may receive in the mail after your visit with us. Thank you!             Your Updated Medication List - Protect others around you: Learn how to safely use, store and throw away your medicines at www.disposemymeds.org.           This list is accurate as of: 12/20/17 11:59 PM.  Always use your most recent med list.                   Brand Name Dispense Instructions for use Diagnosis    loperamide 2 MG capsule    IMODIUM     Take 2 mg by mouth as needed for diarrhea        METOPROLOL TARTRATE PO      Take 12.5 mg by mouth 2 times daily        PANTOPRAZOLE SODIUM PO      Take 40 mg by mouth every morning (before breakfast)        rifaximin 550 MG Tabs tablet    XIFAXAN    60 tablet    Take 1 tablet (550 mg) by mouth 2 times daily    Hepatic encephalopathy (H)

## 2017-12-20 NOTE — PROGRESS NOTES
Psychosocial Assessment Update  Rashi Schroeder was seen in the Transplant Center as part of his evaluation as a potential liver transplant recipient.  He was accompanied by his wife Carleen. He was previously seen by this writer in October for consultation. Please see the prior psychosocial assessment for additional information.   Living Situation: Rashi Schroeder (Frank) is a [age] year old man who continues to liver in [place], [place] with hs wife Carleen. Their house is a bi-level. They are aware of the need to remain locally after a liver transplant. They have a son and daughter-in-law who live in [place], and have space for them to stay when here.   Education/Employment:   has worked in construction for about forty years, mostly self-employed. He has not been able to work for about five years, due to a broken back and leg.   Financial /Income: Mr. Schroeder receives Social Security custodial benefits. His wife works at a [employer] school office. She is hoping to retire in June 2018. When she does, she will also collect SSRI and a pension.   Health Insurance: Mr. Schroeder has Medicare Part A only. He is fully covered by his wife's BCBS insurance from her employer. He currently has a BCBS  that has been assisting them. They will ask if he has a transplant travel benefit with this policy. When his wife retires and they no longer have her insurance, he will need to obtain Medicare Part B &D, along with supplemental coverage. I referred them to the [facility] ([phone]) for counseling related to Medicare options in [facility].   This writer talked with Rashi about the financial risks of transplant, particularly about the high cost of transplant related medications and the importance of maintaining adequate health insurance coverage.  Family/Social Support: Mr. Schroeder's main support person continues to be his wife Carleen. Mr. Schroeder has four sons. As previously stated, one lives in [place]. His son and  "daughter-in-law are quite helpful and supportive. Mr. Schroeder has six siblings, three of whom are . One of his sisters also has HUDSON, and is planning to also be seen here next year. Mr. Schroeder's children expressed interest in being liver donors for him, but are reported to be an incompatible blood type. Other potential donors over the age limit.  Mr. Schroeder is an Hinduism Mormon. This writer stressed the importance of having a stable and involved support network before and after transplant.  Provided Migue and Carleen  with education about the relationship between a stable support system and better surgical and post-transplant outcomes compared to patients with a limited support system.    Education was provided about Advance Directives. [place] forms were provided to , per his request, as he is interested in completing them.   Functional Status: Mr. Schroeder has chronic back pain, which impacts his mobility and ability to work. He also has narcolepsy, so does not drive.   Chemical Dependency:  Mr. Schroeder stated that he rarely drank more than two drinks at a time. He has consumed no alcohol for over a year. He smoked cigarettes in the past, but quit over twenty-five years ago. He currently buys hemp oil over the Internet, which he places on his tongue. This is reported to calm his nerves.   Mental Health: No mental health concerns have been reported.   Adjustment to Illness:  Mr. Schroeder reports experiencing anxiety, due to his \"fear of the unknown\". He has been optimistic about having a living donor transplant, but that now appears less likely. He was offered palliative care at home. He and his wife understand that this is not hospice, and that the team could potentially help with symptom management and coping. Mr. Schroeder has adjusted to diabetes, bladder cancer a few years ago, and sepsis resulting in weeks of IV antibiotics. This writer provided Migue and Carleen  with supportive counseling throughout this " interview.  This writer also encouraged them to attend the liver transplant support group for additional support and encouragement.   Impression/Recommendations:   Migue and Carleen verbalized understanding the psychosocial risks of transplant and teaching provided during this evaluation. Mr. Schroeder appears to have a good support network, with his wife and daughter-in-law (who is an RN) his main post transplant caregivers. Finances are adequate for transplant. However, he needs to obtain additional coverage prior to his wife's alf next summer, when his BCBS coverage will end.    has been experiencing some anxiety related to his illness. He would likely benefit from counseling related to this, and potentially medication, as he is self-medicating with hemp oil. He has also expressed interest in the liver transplant support group, when here and able to attend.   Mr. Schroeder otherwise has no current psychosocial barriers to transplantation. He would be able to manage needed post transplant routines. I remain available for psychosocial needs.    Teaching completed during assessment:  1.     Housing and relocation needs post transplant.  2.     Caregiver needs post transplant.  3.     Financial issues related to transplant.  4.     Risks of alcohol use post transplant.  5.     Common psychosocial stressors pre/post transplant.        6.     Liver Transplant support group availability.        7.     Advanced Health Care Directive             Psychosocial Risks of Transplant Reviewed:  1.     Increased stress related to your emotional, family, social, employment, or   financial situation.  2.     Affect on work and/or disability benefits.  3.     Affect on future health and life insurance.  4.     Transplant outcome expectations may not be met.  5.     Mental Health risks: anxiety, depression, PTSD, guilt, grief and chronic fatigue.     Magalys Childers, EVA, [facility] Hospital[facility]

## 2017-12-20 NOTE — PROGRESS NOTES
Pt here for dobutamine stress test.  Test, meds and side effects reviewed with patient.  Achieved target HR at 40 mcg Dobutamine and a total of 0.2 mg IV atropine.  Gave a total of 1.5 mg IV Metoprolol to bring HR back to baseline.  Post monitoring complete and VSS.  Pt escorted out to the gold waiting room.

## 2017-12-21 NOTE — LETTER
12/21/2017       RE: Rashi Schroeder  [address] [place] [id number]     Dear Colleague,    Thank you for referring your patient, Rashi Schroeder, to the [facility] UROLOGY AND INST FOR PROSTATE AND UROLOGIC CANCERS [facility]. Please see a copy of my visit note below.             Chief Complaint:    Non invasive bladder cancer         Consult or Referral:     Mr. Rashi Schroeder is a 67 year old male seen in consultation from Dr. Alas.         History of Present Illness:    Rashi Schroeder is a very pleasant 67 year old male who presents with a history of bladder cancer and liver cirrhosis, currently undergoing evaluation for liver transplant. The bladder cancer is Low grade and does not invade the muscularis propria.  See the template below for the history      Date of Initial Diagnosis: 3/2016  Stage of Initial Diagnosis: Ta  Grade at Initial Diagnosis: Low  Site of First Diagnosis: Bladder   Any Recurrence?: None  Intravesical Treatments: TURBT 3/2016,  Date of Last Cysto: 10/2017  Date of Last Upper Tract Imaging: US abd (liver 2017)    TCC - non invasive low grade papillary  No family history of kidney or bladder cancer  Family hx of prostate cancer (brother)  PSA 2/2016: 0.96           Past Medical History:     Past Medical History:   Diagnosis Date     CAD (coronary artery disease) stenting     MI (myocardial infarction) 2014     Mild aortic stenosis 12/2017    echo q 6 months assessment of aortic valve gradient and PA pressures      Narcolepsy      HUDSON (nonalcoholic steatohepatitis) 9/22/2017     RADAMES (obstructive sleep apnea)      Osteoporosis 12/2017    dexa     Tubular adenoma 01/25/2017    4 mm, care everywhere. Due 2022     Urinary bladder neoplasm             Past Surgical History:   No past surgical history on file.         Medications     Current Outpatient Prescriptions   Medication     acetaminophen 500 MG CAPS     atorvastatin (LIPITOR) 80 MG tablet      blood glucose monitoring (INDIRA CONTOUR NEXT) test strip     budesonide-formoterol (SYMBICORT) 160-4.5 MCG/ACT Inhaler     dicyclomine (BENTYL) 10 MG capsule     diphenoxylate-atropine (LOMOTIL) 2.5-0.025 MG per tablet     ferrous sulfate (IRON) 325 (65 FE) MG tablet     metoprolol (TOPROL-XL) 25 MG 24 hr tablet     albuterol (PROAIR HFA/PROVENTIL HFA/VENTOLIN HFA) 108 (90 BASE) MCG/ACT Inhaler     Lactobacillus (ACIDOPHILUS) CAPS     rifaximin (XIFAXAN) 550 MG TABS tablet     METOPROLOL TARTRATE PO     loperamide (IMODIUM) 2 MG capsule     PANTOPRAZOLE SODIUM PO     No current facility-administered medications for this visit.             Family History:   No family history on file.         Social History:     Social History     Social History     Marital status:      Spouse name: N/A     Number of children: N/A     Years of education: N/A     Occupational History     Not on file.     Social History Main Topics     Smoking status: Former Smoker     Smokeless tobacco: Never Used      Comment: quit 26 years ago     Alcohol use No     Drug use: No     Sexual activity: Not on file     Other Topics Concern     Not on file     Social History Narrative            Allergies:   Latex         Review of Systems:  From intake questionnaire     Negative 14 system review except as noted on HPI, nurse's note.      Labs and Pathology:    I reviewed all applicable laboratory and pathology data and went over findings with patient  Significant for   Lab Results   Component Value Date    CR 1.08 12/20/2017    CR 0.88 10/17/2017           Imaging:    I reviewed all applicable imaging and went over findings with patient.  Significant for: none      Outside and Past Medical records:    I spent 10 minutes reviewing outside and past medical records.       Standardized Questionnaire:      Not completed.          Assessment and Plan:     Assessment:  Bladder Cancer [id number] (low grade, papillary TCC)      Plan:    - Cystoscopy was  recently completed (10/2017) and showed no recurrence   - Cytology with FISH  - If cytology is negative, he can proceed with liver transplant         Orders  Orders Placed This Encounter   Procedures     Cytology non gyn       F/U:   as needed    Scribe Disclosure:   I,Wagner Wyatt, am serving as a scribe; to document services personally performed by Rafael Ahuja MD based on data collection and the provider's statements to me.     Rafael Ahuja MD  Department of Urology [facility]      Attestation:  This patient was seen and evaluated by me, with a scribe taking notes.  I have reviewed the note above and agree.  The physical exam was performed by me and the pertinant details are outlined below.            Physical Exam:     Patient is a 67 year old  male   Vitals: Blood pressure 118/63, pulse 74.  General Appearance Adult: Alert, no acute distress, oriented  HENT: throat/mouth:normal, good dentition  Lungs: no respiratory distress, or pursed lip breathing  Heart: No obvious jugular venous distension present  Abdomen: soft, nontender, no organomegaly or masses, There is no height or weight on file to calculate BMI.,  Lymphatics: No cervical or supraclavicular adenopathy  Musculoskeltal: extremities normal, no peripheral edema  Skin: no suspicious lesions or rashes  Neuro: Alert, oriented, speech and mentation normal  Psych: affect and mood normal  Gait: Normal  : Deferred     Assessment:  Bladder Cancer [id number] (low grade, papillary TCC)      Plan:    - Cystoscopy was recently completed (10/2017) and showed no recurrence   - Cytology with FISH  - If cytology is negative, he can proceed with liver transplant       Rafael Ahuja MD  Department of Urology  [facility]    CC  Patient Care Team:  Gume Armstrong as PCP - General (Internal Medicine)  Denis Winchester MD Hauge, Gregory A, PhD  Ananth Deng Steven P (Urology)  Ebony  DO Kathleen Stoll Lynn M, RN as Registered Nurse (Transplant)  Leigha, Rafael Saenz MD as MD (Urology)  Grace Nicholson, RN as Registered Nurse (Oncology)  MATT BOYCE    Copy to patient  LESLIE GALDAMEZ  [address] [place] [phone]

## 2017-12-21 NOTE — LETTER
Date:January 2, 2018      Provider requested that no letter be sent. Do not send.       [facility] Health Information

## 2017-12-21 NOTE — NURSING NOTE
Chief Complaint   Patient presents with     Consult     Bladder cancer consult     Chrissy Lofton RN

## 2017-12-21 NOTE — MR AVS SNAPSHOT
After Visit Summary   12/21/2017    Rashi Schroeder    MRN: [id number]           Patient Information     Date Of Birth          1950        Visit Information        Provider Department      12/21/2017 1:30 PM UC TRANSPLANT CLASS [facility]        Today's Diagnoses     HUDSON (nonalcoholic steatohepatitis)    -  1       Follow-ups after your visit        Your next 10 appointments already scheduled     Feb 12, 2018  8:30 AM CST   Lab with UC LAB   [facility] ([facility])    [address]  1st Floor  [facility] [address] [phone]            Feb 12, 2018  9:20 AM CST   (Arrive by 9:05 AM)   Return Liver Transplant with Felicia Alas MD   [facility] ([facility])    [address]  3rd [facility] [address] [phone]              Future tests that were ordered for you today     Open Future Orders        Priority Expected Expires Ordered    Follow-Up with Advanced Heart Failure Clinic Routine 8/21/2018 12/21/2018 12/21/2017    Echocardiogram Complete Routine 8/21/2018 12/21/2018 12/21/2017    Echocardiogram Complete Routine 2/12/2018 12/21/2018 12/21/2017    ECHO STRESS DOBUTAMINE WITH OPTISON Routine  10/18/2018 10/18/2017            Who to contact     If you have questions or need follow up information about today's clinic visit or your schedule please contact [facility] SOLID ORGAN TRANSPLANT directly at [phone].  Normal or non-critical lab and imaging results will be communicated to you by MyChart, letter or phone within 4 business days after the clinic has received the results. If you do not hear from us within 7 days, please contact the clinic through MyChart or phone. If you have a critical or abnormal lab result, we will notify you by phone as soon as possible.  Submit refill requests through Onit or call your pharmacy and they will forward the refill request to us. Please  allow 3 business days for your refill to be completed.          Additional Information About Your Visit        MyChart Information     [employer] gives you secure access to your electronic health record. If you see a primary care provider, you can also send messages to your care team and make appointments. If you have questions, please call your primary care clinic.  If you do not have a primary care provider, please call [phone] and they will assist you.        Care EveryWhere ID     This is your Care EveryWhere ID. This could be used by other organizations to access your [facility] medical records  [id number]         Blood Pressure from Last 3 Encounters:   12/21/17 118/63   12/21/17 120/71   12/21/17 92/54    Weight from Last 3 Encounters:   12/21/17 100.6 kg (221 lb 12.8 oz)   12/21/17 101.6 kg (224 lb)   10/17/17 91.4 kg (201 lb 8 oz)              Today, you had the following     No orders found for display       Primary Care Provider Office Phone # Fax #    Gume Armstrong [phone]       [facility] [address] [place] [address]        Equal Access to Services     [facility]: Hadii aad ku hadasho Sojuanita, waaxda luqadaha, qaybta kaalmada adeegyada, sterling escobar . So [facility] [phone].    ATENCIÓN: Si habla español, tiene a delgado disposición servicios gratuitos de asistencia lingüística. Llame al [phone].    We comply with applicable federal civil rights laws and [place] laws. We do not discriminate on the basis of race, color, national origin, age, disability, sex, sexual orientation, or gender identity.            Thank you!     Thank you for choosing [facility]  for your care. Our goal is always to provide you with excellent care. Hearing back from our patients is one way we can continue to improve our services. Please take a few minutes to complete the written survey that you may receive in the mail after your  visit with us. Thank you!             Your Updated Medication List - Protect others around you: Learn how to safely use, store and throw away your medicines at www.disposemymeds.org.          This list is accurate as of: 12/21/17  4:14 PM.  Always use your most recent med list.                   Brand Name Dispense Instructions for use Diagnosis    acetaminophen 500 MG Caps           acidophilus Caps           albuterol 108 (90 BASE) MCG/ACT Inhaler    PROAIR HFA/PROVENTIL HFA/VENTOLIN HFA     Inhale 2 puffs into the lungs        atorvastatin 80 MG tablet    LIPITOR     TAKE 1 TABLET BY MOUTH AT BEDTIME.        [employer] CONTOUR NEXT test strip   Generic drug:  blood glucose monitoring           budesonide-formoterol 160-4.5 MCG/ACT Inhaler    SYMBICORT     Inhale 1 puff into the lungs        dicyclomine 10 MG capsule    BENTYL     Take 10 mg by mouth        diphenoxylate-atropine 2.5-0.025 MG per tablet    LOMOTIL          ferrous sulfate 325 (65 FE) MG tablet    IRON     Take 325 mg by mouth        loperamide 2 MG capsule    IMODIUM     Take 2 mg by mouth as needed for diarrhea        metoprolol 25 MG 24 hr tablet    TOPROL-XL          METOPROLOL TARTRATE PO      Take 12.5 mg by mouth 2 times daily        PANTOPRAZOLE SODIUM PO      Take 40 mg by mouth every morning (before breakfast)        rifaximin 550 MG Tabs tablet    XIFAXAN    60 tablet    Take 1 tablet (550 mg) by mouth 2 times daily    Hepatic encephalopathy (H)

## 2017-12-21 NOTE — LETTER
12/21/2017       RE: Rashi Schroeder  [address] [place] [address]     Dear Colleague,    Thank you for referring your patient, Rashi Schroeder, to the [facility] TRANSPL[facility] at [facility]. Please see a copy of my visit note below.    Liver Transplant Evaluation/Teaching    TEACHING TOPICS: Evaluation Process, Evaluation Items, Diagnostic Studies, Consultation, Chemical Dependency Policy, CD Eval, Donor Source, Liver Allocation, MELD System, UNOS, Waiting List, Follow up while on transplant list, Follow up after transplantation, Infection and Rejection, Immunosuppression , Medication Teaching, Lab Recording after transplant, Laboratory Frequency after transplant , Consent for evaluation and One year survival rates  INSTRUCTIONAL MATERIAL USED/GIVEN: Liver Transplant Handbook, MELD Booklet, Donor Booklet, Web Sites Options, Verbal instructions, Multiple Listing Brochure , Consent for evaluation signed, One year survival rates and SRTR (Scientific Registry) Data  Person(s) involved in teaching: Patient and wife  Asks Questions: YES  Eager to Learn: YES  Cooperative: YES  Receptive (willing/able to accept information): YES  Reason for the appointment, diagnosis and treatment plan:YES  Knowledge of proper use of medications and conditions for which they are ordered (with special attention to potential side effects or drug interactions): YES  Which situations necessitate calling provider and whom to contact:YES  Other: ED for GI bleed  Teaching Concerns Addressed   Comments: Patient attended class on 12/21/2017. Patient asked appropriate questions and verbalized understanding of the material.   Proper use and care of (medical equip, care aids, etc.):YES  Nutritional needs and diet plan: YES  Pain management techniques: YES  Wound Care: YES  How and/when to access community resources: YES  Patient is aware of and agrees to required commitment to post-op care and long term  follow-up: YES  Patient has name and phone number of transplant coordinator.   Time Spent face-to-face teachin minutes.      Again, thank you for allowing me to participate in the care of your patient.      Sincerely,    Transplant Class

## 2017-12-21 NOTE — PROGRESS NOTES
"Outpatient MNT: Liver Transplant Evaluation    Current BMI: 31.2  BMI is within criteria of <40 for liver transplant     Time Spent: 30 minutes  Visit Type: Initial  Referring Physician: Dr White  Pt accompanied by: Carleen     History of previous txp: none    Nutrition Assessment  Carleen cooks at home. Pt reports he feels as if he's being \"forced fed\". A lot of food just isn't appealing to him anymore. Pt is lactose intolerant and reports dislike of ONS. He did purchase protein powder and mixed it with milk and water, but also disliked this. Pt has met with RD in [place], who also recommended he increase his protein intake.     Appetite: poor since March/April and hasn't really recovered    Vitamins, Supplements, Pertinent Meds: iron, probiotic   Herbal Medicines/Supplements: none    Diet Recall  Breakfast Cereal, oats, or cream of wheat    Lunch Not hungry, may snack on a cookie    Dinner Mac and cheese, soup, enchiladas, burgers   Snacks Fruit, pickled herring, popcorn    Beverages water   Dining out seldom     Physical Activity  None d/t lack of energy      Anthropometrics  Height:   71 in   BMI:    31.2    Weight Status:Obesity Grade I BMI 30-34.9   Weight:  224 lbs            IBW (lb): 172  % IBW: 130    Wt Hx:  lbs 1 year ago. Lowest weight last month of ~200 lbs. Pt currently reports ascites and edema. Noted moderate/severe muscle wasting. Pt reports most concerned with arms.     Adj/dosing BW: 185 lbs/84 kg       Frailty Screening   Weakness Meets criteria for frailty if  strength (average of 3 trials, dominant hand) is:    Men  ?29 kg for BMI ?24  ?30 kg for BMI 24.1-26  ?30 kg for BMI 26.1-28  ?32 kg for BMI >28 Women  ?17 kg for BMI ?23  ?17.3 kg for BMI 23.1-26  ?18 kg for BMI 26.1-29  ?21 kg for BMI >29    Equipment: Alexandre hand dynamometer  Participant attempts to squeeze the dynamometer maximally 3 times with the dominant hand.   Average of 3 trials: 27 kg with BMI of 31.2  Meets " "criteria for frailty based on handgrip strength: Y    Malnutrition  % Intake: <75% for >/= 3 months (non-severe malnutrition)  % Weight Loss: unable to verify starting weight prior to weight loss but per pt report, likely meets criteria for severe malnutrition   Subcutaneous Fat Loss: Moderate  Muscle Loss: Moderate/Severe  Fluid Accumulation/Edema: unknown degree of edema   Malnutrition Diagnosis: Non-Severe malnutrition vs severe malnutrition in the context of chronic illness    Estimated Nutrition Needs  Energy  5699-2852     (25-30 kcal/kg for maintenance)     Protein  +    (1-1.2+ g/kg for increased needs)           Fluid  1 ml/kcal or per MD        Micronutrient   Na+: <2000 mg/day            Nutrition Diagnosis  Inadequate protein intake r/t poor appetite and the fact that food is unappealing AEB pt report, diet recall showing minimal protein intake not meeting minimum recommendation of 84 g/day, moderate/severe muscle loss noted by pt and writer.     Nutrition Intervention  Nutrition education provided:    Reviewed adequate protein intake. Encouraged receiving protein from both animal and plant based sources. Discussed that he should be able to tolerate Boost or Ensure, as they are lactose free, however, encouraged him to try protein powder in another form ideally minimum of once daily. Provided various recipes utilizing protein powder, listing calorie and protein content of each smoothie. Discussed other sources of protein and even trying unflavored protein powder to mix into oatmeal, soup, etc. Encouraged pt to think of \"food as medicine\" and to make a smoothie in the morning to take with meds or to set an alarm to have one daily at noon, etc.     Reviewed post txp diet guidelines in brief (will review in further detail post txp):  (1) Review of proper food safety measures d/t immunosuppressant therapy post-op and increased risk for food-borne illness   (2) Stressed importance of not taking any " herbal/Chinese/alternative medicines or supplements post txp (d/t risk for rejection, unknown effects on the organs, potential interactions with immunosuppresants).   (3) Med regimen and possible side effects    Patient Understanding: Pt verbalized understanding of education provided.  Expected Compliance: Good  Follow-Up Plans: PRN     Nutrition Goals  1. Minimum of 1 protein shake/day   2. Pt to verbalize understanding of 3 aspects of post txp education provided    Provided pt with contact info.   Magi Baez RD, LD  [facility] [phone]

## 2017-12-21 NOTE — PROGRESS NOTES
Heron Harkins M.D.  Cardiovascular Medicine    I personally saw and examined this patient, discussed care with housestaff and other consultants, reviewed current laboratories and imaging studies, and conveyed impression and diagnostic/therapeutic plan to patient.    Problem List  1. ASHD with stenting  2. Chronic liver failure/HUDSON  3. Bladder tumor/surgically removed  4. Narcolepsy  5. Hyperlipidemia  6. Family history of HUDSON   7. Sleep apnea untreated  8. Hypoproteinemia  9. 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     From a cardiovascular point of view the patient has history of ASHD with previous stenting with current stress test without evidence of active ischemia.  The patient has no history of exertional syncope, pre-syncope, symptoms of right or left ventricular failure, ASPVD with claudication.  He has no history of DVT/PE, collagen vascular disease, congenital heart disease, myeloproliferative syndrome or other disease states associated with pulmonary hypertension.  His echocardiogram does slow evidence of aortic valve calcification with modest gradient and no history of symptoms suggestive of aortic valve disease.    Answers for HPI/ROS submitted by the patient on 12/18/2017   General Symptoms: Yes  Skin Symptoms: No  HENT Symptoms: No  EYE SYMPTOMS: Yes  HEART SYMPTOMS: Yes  LUNG SYMPTOMS: Yes  INTESTINAL SYMPTOMS: Yes  URINARY SYMPTOMS: No  REPRODUCTIVE SYMPTOMS: No  SKELETAL SYMPTOMS: Yes  BLOOD SYMPTOMS: Yes  NERVOUS SYSTEM SYMPTOMS: Yes  MENTAL HEALTH SYMPTOMS: Yes  Fever: No  Loss of appetite: Yes  Weight loss: Yes  Weight gain: No  Fatigue: Yes  Night sweats: No  Chills: Yes  Increased stress: Yes  Excessive hunger: No  Excessive thirst: No  Feeling hot or cold when others believe the temperature is normal: Yes  Loss of height: No  Post-operative complications: No  Surgical site  pain: No  Hallucinations: No  Change in or Loss of Energy: Yes  Hyperactivity: No  Confusion: Yes  Eye pain: No  Vision loss: No  Dry eyes: Yes  Watery eyes: No  Eye bulging: No  Double vision: No  Flashing of lights: No  Spots: No  Floaters: Yes  Redness: No  Crossed eyes: No  Tunnel Vision: No  Yellowing of eyes: No  Eye irritation: No  Cough: No  Sputum or phlegm: No  Coughing up blood: No  Difficulty breating or shortness of breath: Yes  Snoring: Yes  Wheezing: No  Difficulty breathing on exertion: Yes  Nighttime Cough: No  Difficulty breathing when lying flat: No  Chest pain or pressure: No  Fast or irregular heartbeat: No  Pain in legs with walking: No  Trouble breathing while lying down: No  Fingers or toes appear blue: No  High blood pressure: No  Low blood pressure: No  Fainting: No  Murmurs: Yes  Pacemaker: No  Varicose veins: No  Edema or swelling: Yes  Wake up at night with shortness of breath: No  Light-headedness: No  Exercise intolerance: Yes  Heart burn or indigestion: No  Nausea: No  Vomiting: No  Abdominal pain: Yes  Bloating: No  Constipation: No  Diarrhea: Yes  Blood in stool: Yes  Black stools: Yes  Rectal or Anal pain: No  Fecal incontinence: No  Yellowing of skin or eyes: No  Vomit with blood: No  Change in stools: No  Back pain: Yes  Muscle aches: Yes  Neck pain: Yes  Swollen joints: No  Joint pain: Yes  Bone pain: Yes  Muscle cramps: No  Muscle weakness: Yes  Joint stiffness: Yes  Bone fracture: Yes  Anemia: Yes  Swollen glands: No  Easy bleeding or bruising: Yes  Trouble with coordination: Yes  Dizziness or trouble with balance: Yes  Fainting or black-out spells: No  Memory loss: Yes  Headache: No  Seizures: No  Speech problems: No  Tingling: No  Tremor: No  Weakness: Yes  Difficulty walking: Yes  Paralysis: No  Numbness: No  Nervous or Anxious: Yes  Depression: Yes  Trouble sleeping: Yes  Trouble thinking or concentrating: Yes  Mood changes: Yes  Panic attacks: No    Objective  /71  "(BP Location: Right arm, Patient Position: Chair, Cuff Size: Adult Regular)  Pulse 70  Ht 1.803 m (5' 11\")  Wt 100.6 kg (221 lb 12.8 oz)  SpO2 98%  BMI 30.93 kg/m2   Constitutional: alert, oriented, normal gait and station, normal mentation.  Oral: moist mucous membrans  Lymph: without pathologic adenopathy  Chest: clear to ausculation and percussion  Cor: No evidence of left or right ventricular activity.  Rhythm is regular.  S1 normal, S2 split physiologically. Soft systolic murmur across aortic valve without gallop, lift, diminished tone of second heart tone  Abdomen: without tenderness, rebound, guarding, masses, but possible ascites  Extremities: Edema not present  Neuro: no focal defects, normal mentation  Skin: without open lesions  Psych: oriented, verbal, mental status in tact    Wt Readings from Last 5 Encounters:   12/21/17 100.6 kg (221 lb 12.8 oz)   12/21/17 101.6 kg (224 lb)   10/17/17 91.4 kg (201 lb 8 oz)   09/19/17 86.2 kg (190 lb)       Meds  Current Outpatient Prescriptions   Medication     acetaminophen 500 MG CAPS     atorvastatin (LIPITOR) 80 MG tablet     budesonide-formoterol (SYMBICORT) 160-4.5 MCG/ACT Inhaler     dicyclomine (BENTYL) 10 MG capsule     ferrous sulfate (IRON) 325 (65 FE) MG tablet     metoprolol (TOPROL-XL) 25 MG 24 hr tablet     albuterol (PROAIR HFA/PROVENTIL HFA/VENTOLIN HFA) 108 (90 BASE) MCG/ACT Inhaler     Lactobacillus (ACIDOPHILUS) CAPS     rifaximin (XIFAXAN) 550 MG TABS tablet     METOPROLOL TARTRATE PO     loperamide (IMODIUM) 2 MG capsule     PANTOPRAZOLE SODIUM PO     blood glucose monitoring ([employer]IRA CONTOUR NEXT) test strip     diphenoxylate-atropine (LOMOTIL) 2.5-0.025 MG per tablet     No current facility-administered medications for this visit.      Labs    Results for LESLIE GALDAMEZ (MRN [id number]) as of 12/21/2017 15:37   Ref. Range 12/20/2017 09:50 12/20/2017 09:51   Sodium Latest Ref Range: 133 - 144 mmol/L 142    Potassium Latest Ref Range: 3.4 - " 5.3 mmol/L 3.6    Chloride Latest Ref Range: 94 - 109 mmol/L 112 (H)    Carbon Dioxide Latest Ref Range: 20 - 32 mmol/L 22    Urea Nitrogen Latest Ref Range: 7 - 30 mg/dL 8    Creatinine Latest Ref Range: 0.66 - 1.25 mg/dL 1.08    GFR Estimate Latest Ref Range: >60 mL/min/1.7m2 68    GFR Estimate If Black Latest Ref Range: >60 mL/min/1.7m2 82    Calcium Latest Ref Range: 8.5 - 10.1 mg/dL 7.3 (L)    Anion Gap Latest Ref Range: 3 - 14 mmol/L 9    Phosphorus Latest Ref Range: 2.5 - 4.5 mg/dL 2.5    Albumin Latest Ref Range: 3.4 - 5.0 g/dL 2.0 (L)    Protein Total Latest Ref Range: 6.8 - 8.8 g/dL 6.4 (L)    Bilirubin Total Latest Ref Range: 0.2 - 1.3 mg/dL 2.9 (H)    Alkaline Phosphatase Latest Ref Range: 40 - 150 U/L 128    ALT Latest Ref Range: 0 - 70 U/L 33    AST Latest Ref Range: 0 - 45 U/L 44    Alpha Fetoprotein Latest Ref Range: 0 - 8 ug/L 3.3    Bilirubin Direct Latest Ref Range: 0.0 - 0.2 mg/dL 0.7 (H)    PSA Latest Ref Range: 0 - 4 ug/L 0.21    TSH Latest Ref Range: 0.40 - 4.00 mU/L 1.09    Vitamin D Deficiency screening Latest Ref Range: 20 - 75 ug/L 8 (L)    Glucose Latest Ref Range: 70 - 99 mg/dL 108 (H)    WBC Latest Ref Range: 4.0 - 11.0 10e9/L 3.5 (L)    Hemoglobin Latest Ref Range: 13.3 - 17.7 g/dL 8.8 (L)    Hematocrit Latest Ref Range: 40.0 - 53.0 % 28.3 (L)    Platelet Count Latest Ref Range: 150 - 450 10e9/L 37 (LL)    RBC Count Latest Ref Range: 4.4 - 5.9 10e12/L 2.71 (L)    MCV Latest Ref Range: 78 - 100 fl 104 (H)    MCH Latest Ref Range: 26.5 - 33.0 pg 32.5    MCHC Latest Ref Range: 31.5 - 36.5 g/dL 31.1 (L)    RDW Latest Ref Range: 10.0 - 15.0 % 17.8 (H)    INR Latest Ref Range: 0.86 - 1.14  1.93 (H)    Antibody Titer Unknown  Anti A1 IgG>256...     Imaging   [facility]  [address] [place] [address]     Name: LESLIE GALDAMEZ  MRN: [id number]  : 1950  Study Date: 2017 12:39 PM  Age: 67 yrs  Gender:  Male  Patient Location: [facility]  Reason For Study: Nonalcoholic steatohepatitis (HUDSON), Cirrhosis (H), Portal  hyp  Ordering Physician: MATT BOYCE  Referring Physician: MATT BOYCE  Performed By: Jie Sun RDCS     BSA: 2.1 m2  Height: 71 in  Weight: 200 lb  BP: 121/69 mmHg  _____________________________________________________________________________  __     _____________________________________________________________________________  __        Interpretation Summary  Normal, low-risk dobutamine echocardiogram with slightly reduced sensitivity  due to hypotensive response to dobutamine. Calcified aortic valve with mild  aortic stenosis (peak velocity 2.9 m/s.)     The target heart rate was achieved.  Normal biventricular size, thickness, and global systolic function at  baseline, LVEF=60-65%.  With dobutamine, LVEF increased to >70% and LV cavity size decreased  appropriately.  No regional wall motion abnormalities at rest or with dobutamine.  No angina was elicited.  No ECG evidence of ischemia. Normal heart rate and hypotensive blood pressure  response to dobutamine.  Calcified aortic valve with mild aortic stenosis (peak velocity 2.9 m/s.)  The aortic root and visualized ascending aorta are normal.  Assessment/Plan   1. There is no specific contraindication to liver transplant from cardiac perspective save for need of estimation of pulmonary artery pressure  2. May consider antibiotics with cystoscopy if necessary  3. Should have repeat echocardiograms for assessment of aortic valve gradient and PA pressures at 6 moth intervals.

## 2017-12-21 NOTE — MR AVS SNAPSHOT
After Visit Summary   12/21/2017    Rashi Schroeder    MRN: [id number]           Patient Information     Date Of Birth          1950        Visit Information        Provider Department      12/21/2017 3:00 PM Heron Harkins MD [facility]        Today's Diagnoses     HUDSON (nonalcoholic steatohepatitis)    -  1      Care Instructions    You were seen at the [facility] clinic today.  You saw Dr. Harkins  Here are your Instructions:    1.  Echo in February.  2.  Echo & f/u in August.      Aura Cintron RN  Nurse Care Coordinator  Office:  [phone] option #1, then #3 & ask for Aura (nurse line)  Fax:  [phone]  After Hours:  [phone]  Appointments:  [phone] option #1, then option #1                        Follow-ups after your visit        Additional Services     Follow-Up with [facility]                 Your next 10 appointments already scheduled     Dec 21, 2017  4:00 PM CST   (Arrive by 3:45 PM)   Bladder Cancer with Rafael Ahuja MD   [facility] Urology and [facility] for Prostate and Urologic Cancers ([facility])    [address] [place] [address]  4th [facility] [id number] [phone]            Feb 12, 2018  8:30 AM CST   Lab with  LAB   [facility] Lab ([facility])    [address] [place] [address]  1st [facility] [id number] [phone]            Feb 12, 2018  9:20 AM CST   (Arrive by 9:05 AM)   Return Liver Transplant with Felicia Alas MD   [facility] Hepatology ([facility])    [address] [place] [address]  3rd [facility] [id number] [phone]              Future tests that were ordered for you today     Open Future Orders        Priority Expected Expires Ordered    Follow-Up with Advanced Heart Failure Clinic Routine 8/21/2018 12/21/2018 12/21/2017    Echocardiogram Complete Routine 8/21/2018 12/21/2018  "12/21/2017    Echocardiogram Complete Routine 2/12/2018 12/21/2018 12/21/2017    ECHO STRESS DOBUTAMINE WITH OPTISON Routine  10/18/2018 10/18/2017            Who to contact     If you have questions or need follow up information about today's clinic visit or your schedule please contact [facility] directly at [phone].  Normal or non-critical lab and imaging results will be communicated to you by panpanhart, letter or phone within 4 business days after the clinic has received the results. If you do not hear from us within 7 days, please contact the clinic through [employer] or phone. If you have a critical or abnormal lab result, we will notify you by phone as soon as possible.  Submit refill requests through Text A Cab or call your pharmacy and they will forward the refill request to us. Please allow 3 business days for your refill to be completed.          Additional Information About Your Visit        [email or web address] Information     Text A Cab gives you secure access to your electronic health record. If you see a primary care provider, you can also send messages to your care team and make appointments. If you have questions, please call your primary care clinic.  If you do not have a primary care provider, please call [phone] and they will assist you.        Care EveryWhere ID     This is your Care EveryWhere ID. This could be used by other organizations to access your [place] medical records  [id number]        Your Vitals Were     Pulse Height Pulse Oximetry BMI (Body Mass Index)          70 1.803 m (5' 11\") 98% 30.93 kg/m2         Blood Pressure from Last 3 Encounters:   12/21/17 120/71   12/21/17 92/54   10/17/17 123/78    Weight from Last 3 Encounters:   12/21/17 100.6 kg (221 lb 12.8 oz)   12/21/17 101.6 kg (224 lb)   10/17/17 91.4 kg (201 lb 8 oz)               Primary Care Provider Office Phone # Fax #    Gume Armstrong [phone]       [facility] [address] [place] " [place] [address]        Equal Access to Services     [facility]: Hadii ranjan ku alvaro Lovell, waaxda luqadaha, qaybta kaalmada alpamayur, sterling demetrio engraisaricardo escobar . So [facility] [phone].    ATENCIÓN: Si habla español, tiene a delgado disposición servicios gratuitos de asistencia lingüística. Llame al [phone].    We comply with applicable federal civil rights laws and [place] laws. We do not discriminate on the basis of race, color, national origin, age, disability, sex, sexual orientation, or gender identity.            Thank you!     Thank you for choosing [facility]  for your care. Our goal is always to provide you with excellent care. Hearing back from our patients is one way we can continue to improve our services. Please take a few minutes to complete the written survey that you may receive in the mail after your visit with us. Thank you!             Your Updated Medication List - Protect others around you: Learn how to safely use, store and throw away your medicines at www.disposemymeds.org.          This list is accurate as of: 12/21/17  3:58 PM.  Always use your most recent med list.                   Brand Name Dispense Instructions for use Diagnosis    acetaminophen 500 MG Caps           acidophilus Caps           albuterol 108 (90 BASE) MCG/ACT Inhaler    PROAIR HFA/PROVENTIL HFA/VENTOLIN HFA     Inhale 2 puffs into the lungs        atorvastatin 80 MG tablet    LIPITOR     TAKE 1 TABLET BY MOUTH AT BEDTIME.        INDIRA CONTOUR NEXT test strip   Generic drug:  blood glucose monitoring           budesonide-formoterol 160-4.5 MCG/ACT Inhaler    SYMBICORT     Inhale 1 puff into the lungs        dicyclomine 10 MG capsule    BENTYL     Take 10 mg by mouth        diphenoxylate-atropine 2.5-0.025 MG per tablet    LOMOTIL          ferrous sulfate 325 (65 FE) MG tablet    IRON     Take 325 mg by mouth        loperamide 2 MG capsule    IMODIUM     Take 2 mg by mouth as needed for diarrhea         metoprolol 25 MG 24 hr tablet    TOPROL-XL          METOPROLOL TARTRATE PO      Take 12.5 mg by mouth 2 times daily        PANTOPRAZOLE SODIUM PO      Take 40 mg by mouth every morning (before breakfast)        rifaximin 550 MG Tabs tablet    XIFAXAN    60 tablet    Take 1 tablet (550 mg) by mouth 2 times daily    Hepatic encephalopathy (H)

## 2017-12-21 NOTE — PROGRESS NOTES
Chief Complaint:    Non invasive bladder cancer         Consult or Referral:     Mr. Rashi Schroeder is a 67 year old male seen in consultation from Dr. Alas.         History of Present Illness:    Rashi Schroeder is a very pleasant 67 year old male who presents with a history of bladder cancer and liver cirrhosis, currently undergoing evaluation for liver transplant. The bladder cancer is Low grade and does not invade the muscularis propria.  See the template below for the history      Date of Initial Diagnosis: 3/2016  Stage of Initial Diagnosis: Ta  Grade at Initial Diagnosis: Low  Site of First Diagnosis: Bladder   Any Recurrence?: None  Intravesical Treatments: TURBT 3/2016,  Date of Last Cysto: 10/2017  Date of Last Upper Tract Imaging: US abd (liver 2017)    TCC - non invasive low grade papillary  No family history of kidney or bladder cancer  Family hx of prostate cancer (brother)  PSA 2/2016: 0.96           Past Medical History:     Past Medical History:   Diagnosis Date     CAD (coronary artery disease) stenting     MI (myocardial infarction) 2014     Mild aortic stenosis 12/2017    echo q 6 months assessment of aortic valve gradient and PA pressures      Narcolepsy      HUDSON (nonalcoholic steatohepatitis) 9/22/2017     RADAMES (obstructive sleep apnea)      Osteoporosis 12/2017    dexa     Tubular adenoma 01/25/2017    4 mm, care everywhere. Due 2022     Urinary bladder neoplasm             Past Surgical History:   No past surgical history on file.         Medications     Current Outpatient Prescriptions   Medication     acetaminophen 500 MG CAPS     atorvastatin (LIPITOR) 80 MG tablet     blood glucose monitoring (INDIRA CONTOUR NEXT) test strip     budesonide-formoterol (SYMBICORT) 160-4.5 MCG/ACT Inhaler     dicyclomine (BENTYL) 10 MG capsule     diphenoxylate-atropine (LOMOTIL) 2.5-0.025 MG per tablet     ferrous sulfate (IRON) 325 (65 FE) MG tablet     metoprolol (TOPROL-XL) 25 MG 24 hr tablet      albuterol (PROAIR HFA/PROVENTIL HFA/VENTOLIN HFA) 108 (90 BASE) MCG/ACT Inhaler     Lactobacillus (ACIDOPHILUS) CAPS     rifaximin (XIFAXAN) 550 MG TABS tablet     METOPROLOL TARTRATE PO     loperamide (IMODIUM) 2 MG capsule     PANTOPRAZOLE SODIUM PO     No current facility-administered medications for this visit.             Family History:   No family history on file.         Social History:     Social History     Social History     Marital status:      Spouse name: N/A     Number of children: N/A     Years of education: N/A     Occupational History     Not on file.     Social History Main Topics     Smoking status: Former Smoker     Smokeless tobacco: Never Used      Comment: quit 26 years ago     Alcohol use No     Drug use: No     Sexual activity: Not on file     Other Topics Concern     Not on file     Social History Narrative            Allergies:   Latex         Review of Systems:  From intake questionnaire     Negative 14 system review except as noted on HPI, nurse's note.      Labs and Pathology:    I reviewed all applicable laboratory and pathology data and went over findings with patient  Significant for   Lab Results   Component Value Date    CR 1.08 12/20/2017    CR 0.88 10/17/2017           Imaging:    I reviewed all applicable imaging and went over findings with patient.  Significant for: none      Outside and Past Medical records:    I spent 10 minutes reviewing outside and past medical records.       Standardized Questionnaire:      Not completed.          Assessment and Plan:     Assessment:  Bladder Cancer [id number] (low grade, papillary TCC)      Plan:    - Cystoscopy was recently completed (10/2017) and showed no recurrence   - Cytology with FISH  - If cytology is negative, he can proceed with liver transplant         Orders  Orders Placed This Encounter   Procedures     Cytology non gyn       F/U:   as needed    Scribe Disclosure:   Wagner FISHER am serving as a scribe; to  document services personally performed by Rafael Ahuja MD based on data collection and the provider's statements to me.     Rafael Ahuja MD  Department of Urology Staff  [facility]      Attestation:  This patient was seen and evaluated by me, with a scribe taking notes.  I have reviewed the note above and agree.  The physical exam was performed by me and the pertinant details are outlined below.            Physical Exam:     Patient is a 67 year old  male   Vitals: Blood pressure 118/63, pulse 74.  General Appearance Adult: Alert, no acute distress, oriented  HENT: throat/mouth:normal, good dentition  Lungs: no respiratory distress, or pursed lip breathing  Heart: No obvious jugular venous distension present  Abdomen: soft, nontender, no organomegaly or masses, There is no height or weight on file to calculate BMI.,  Lymphatics: No cervical or supraclavicular adenopathy  Musculoskeltal: extremities normal, no peripheral edema  Skin: no suspicious lesions or rashes  Neuro: Alert, oriented, speech and mentation normal  Psych: affect and mood normal  Gait: Normal  : Deferred     Assessment:  Bladder Cancer [id number] (low grade, papillary TCC)      Plan:    - Cystoscopy was recently completed (10/2017) and showed no recurrence   - Cytology with FISH  - If cytology is negative, he can proceed with liver transplant       Rafael Ahuja MD  Department of Urology  [facility]    CC  Patient Care Team:  Gume Armstrong as PCP - General (Internal Medicine)  Denis Winchester MD Hauge, Gregory A, PhD  Ananth Deng Steven P (Urology)  Dodie Beck DO Shriver, Lynn M, RN as Registered Nurse (Transplant)  Leigha, Rafael Saenz MD as MD (Urology)  Grace Nicholson, RN as Registered Nurse (Oncology)  MATT BOYCE    Copy to patient  LESLIE HOLLAND PALM  [address] [place] [address]      Answers for HPI/ROS submitted by the patient on 12/18/2017   General  Symptoms: Yes  Skin Symptoms: No  HENT Symptoms: No  EYE SYMPTOMS: Yes  HEART SYMPTOMS: Yes  LUNG SYMPTOMS: Yes  INTESTINAL SYMPTOMS: Yes  URINARY SYMPTOMS: No  REPRODUCTIVE SYMPTOMS: No  SKELETAL SYMPTOMS: Yes  BLOOD SYMPTOMS: Yes  NERVOUS SYSTEM SYMPTOMS: Yes  MENTAL HEALTH SYMPTOMS: Yes  Fever: No  Loss of appetite: Yes  Weight loss: Yes  Weight gain: No  Fatigue: Yes  Night sweats: No  Chills: Yes  Increased stress: Yes  Excessive hunger: No  Excessive thirst: No  Feeling hot or cold when others believe the temperature is normal: Yes  Loss of height: No  Post-operative complications: No  Surgical site pain: No  Hallucinations: No  Change in or Loss of Energy: Yes  Hyperactivity: No  Confusion: Yes  Eye pain: No  Vision loss: No  Dry eyes: Yes  Watery eyes: No  Eye bulging: No  Double vision: No  Flashing of lights: No  Spots: No  Floaters: Yes  Redness: No  Crossed eyes: No  Tunnel Vision: No  Yellowing of eyes: No  Eye irritation: No  Cough: No  Sputum or phlegm: No  Coughing up blood: No  Difficulty breating or shortness of breath: Yes  Snoring: Yes  Wheezing: No  Difficulty breathing on exertion: Yes  Nighttime Cough: No  Difficulty breathing when lying flat: No  Chest pain or pressure: No  Fast or irregular heartbeat: No  Pain in legs with walking: No  Trouble breathing while lying down: No  Fingers or toes appear blue: No  High blood pressure: No  Low blood pressure: No  Fainting: No  Murmurs: Yes  Pacemaker: No  Varicose veins: No  Edema or swelling: Yes  Wake up at night with shortness of breath: No  Light-headedness: No  Exercise intolerance: Yes  Heart burn or indigestion: No  Nausea: No  Vomiting: No  Abdominal pain: Yes  Bloating: No  Constipation: No  Diarrhea: Yes  Blood in stool: Yes  Black stools: Yes  Rectal or Anal pain: No  Fecal incontinence: No  Yellowing of skin or eyes: No  Vomit with blood: No  Change in stools: No  Back pain: Yes  Muscle aches: Yes  Neck pain: Yes  Swollen joints:  No  Joint pain: Yes  Bone pain: Yes  Muscle cramps: No  Muscle weakness: Yes  Joint stiffness: Yes  Bone fracture: Yes  Anemia: Yes  Swollen glands: No  Easy bleeding or bruising: Yes  Trouble with coordination: Yes  Dizziness or trouble with balance: Yes  Fainting or black-out spells: No  Memory loss: Yes  Headache: No  Seizures: No  Speech problems: No  Tingling: No  Tremor: No  Weakness: Yes  Difficulty walking: Yes  Paralysis: No  Numbness: No  Nervous or Anxious: Yes  Depression: Yes  Trouble sleeping: Yes  Trouble thinking or concentrating: Yes  Mood changes: Yes  Panic attacks: No

## 2017-12-21 NOTE — PATIENT INSTRUCTIONS
You were seen at the [facility] clinic today.  You saw Dr. Harkins  Here are your Instructions:    1.  Echo in February.  2.  Echo & f/u in August.      Aura Cintron RN  Nurse Care Coordinator  Office:  [phone] option #1, then #3 & ask for Aura (nurse line)  Fax:  [phone]  After Hours:  [phone]  Appointments:  [phone] option #1, then option #1

## 2017-12-21 NOTE — LETTER
2017      RE: Rashi Schroeder  [address] [place] [address]       Liver Transplant Evaluation/Teaching    TEACHING TOPICS: Evaluation Process, Evaluation Items, Diagnostic Studies, Consultation, Chemical Dependency Policy, CD Eval, Donor Source, Liver Allocation, MELD System, UNOS, Waiting List, Follow up while on transplant list, Follow up after transplantation, Infection and Rejection, Immunosuppression , Medication Teaching, Lab Recording after transplant, Laboratory Frequency after transplant , Consent for evaluation and One year survival rates  INSTRUCTIONAL MATERIAL USED/GIVEN: Liver Transplant Handbook, MELD Booklet, Donor Booklet, Web Sites Options, Verbal instructions, Multiple Listing Brochure , Consent for evaluation signed, One year survival rates and SRTR (Scientific Registry) Data  Person(s) involved in teaching: Patient and wife  Asks Questions: YES  Eager to Learn: YES  Cooperative: YES  Receptive (willing/able to accept information): YES  Reason for the appointment, diagnosis and treatment plan:YES  Knowledge of proper use of medications and conditions for which they are ordered (with special attention to potential side effects or drug interactions): YES  Which situations necessitate calling provider and whom to contact:YES  Other: ED for GI bleed  Teaching Concerns Addressed   Comments: Patient attended class on 2017. Patient asked appropriate questions and verbalized understanding of the material.   Proper use and care of (medical equip, care aids, etc.):YES  Nutritional needs and diet plan: YES  Pain management techniques: YES  Wound Care: YES  How and/when to access community resources: YES  Patient is aware of and agrees to required commitment to post-op care and long term follow-up: YES  Patient has name and phone number of transplant coordinator.   Time Spent face-to-face teachin minutes.        Transplant Class

## 2017-12-21 NOTE — MR AVS SNAPSHOT
After Visit Summary   12/21/2017    Rashi Schroeder    MRN: [id number]           Patient Information     Date Of Birth          1950        Visit Information        Provider Department      12/21/2017 2:30 PM Magi Baez RD [facility] Solid Organ Transplant        Today's Diagnoses     Nonalcoholic steatohepatitis (HUDSON)    -  1       Follow-ups after your visit        Your next 10 appointments already scheduled     Dec 21, 2017  4:00 PM CST   (Arrive by 3:45 PM)   Bladder Cancer with Rafael Ahuja MD   [facility] Urology and [facility] for Prostate and Urologic Cancers ([facility])    [address] [place] [address]  4th Floor  [facility] [address] [phone]            Feb 12, 2018  8:30 AM CST   Lab with  LAB   [facility] Lab ([facility])    [address] [place] [address]  1st Floor  [facility] [address] [phone]            Feb 12, 2018  9:20 AM CST   (Arrive by 9:05 AM)   Return Liver Transplant with Felicia Alas MD   [facility] Hepatology ([facility])    [address] [place] [address]  3rd [facility] [id number] [phone]              Future tests that were ordered for you today     Open Future Orders        Priority Expected Expires Ordered    ECHO STRESS DOBUTAMINE WITH OPTISON Routine  10/18/2018 10/18/2017            Who to contact     If you have questions or need follow up information about today's clinic visit or your schedule please contact [facility] directly at [phone].  Normal or non-critical lab and imaging results will be communicated to you by Integene Internationalhart, letter or phone within 4 business days after the clinic has received the results. If you do not hear from us within 7 days, please contact the clinic through Inte[employer] Internationalhart or phone. If you have a critical or abnormal lab result, we will notify you by phone as soon as possible.  Submit refill requests through [employer] or  call your pharmacy and they will forward the refill request to us. Please allow 3 business days for your refill to be completed.          Additional Information About Your Visit        CloudPartnerhart Information     CloudPartnerhart gives you secure access to your electronic health record. If you see a primary care provider, you can also send messages to your care team and make appointments. If you have questions, please call your primary care clinic.  If you do not have a primary care provider, please call [phone] and they will assist you.        Care EveryWhere ID     This is your Care EveryWhere ID. This could be used by other organizations to access your [place] medical records  [id number]         Blood Pressure from Last 3 Encounters:   12/21/17 92/54   10/17/17 123/78    Weight from Last 3 Encounters:   12/21/17 100.6 kg (221 lb 12.8 oz)   12/21/17 101.6 kg (224 lb)   10/17/17 91.4 kg (201 lb 8 oz)              Today, you had the following     No orders found for display       Primary Care Provider Office Phone # Fax #    Gume Armstrong [phone]       [facility] [address] [place] [address]        Equal Access to Services     [facility] : Hadii aad ku hadasho Sonoraali, waaxda luqadaha, qaybta kaalmada adeegyada, sterling grover. So [facility] [phone].    ATENCIÓN: Si habla español, tiene a delgado disposición servicios gratuitos de asistencia lingüística. [facility] [phone].    We comply with applicable federal civil rights laws and [place] laws. We do not discriminate on the basis of race, color, national origin, age, disability, sex, sexual orientation, or gender identity.            Thank you!     Thank you for choosing [facility]  for your care. Our goal is always to provide you with excellent care. Hearing back from our patients is one way we can continue to improve our services. Please take a few minutes to complete the  written survey that you may receive in the mail after your visit with us. Thank you!             Your Updated Medication List - Protect others around you: Learn how to safely use, store and throw away your medicines at www.disposemymeds.org.          This list is accurate as of: 12/21/17  3:06 PM.  Always use your most recent med list.                   Brand Name Dispense Instructions for use Diagnosis    acetaminophen 500 MG Caps           acidophilus Caps           albuterol 108 (90 BASE) MCG/ACT Inhaler    PROAIR HFA/PROVENTIL HFA/VENTOLIN HFA     Inhale 2 puffs into the lungs        atorvastatin 80 MG tablet    LIPITOR     TAKE 1 TABLET BY MOUTH AT BEDTIME.        INDIRA CONTOUR NEXT test strip   Generic drug:  blood glucose monitoring           budesonide-formoterol 160-4.5 MCG/ACT Inhaler    SYMBICORT     Inhale 1 puff into the lungs        dicyclomine 10 MG capsule    BENTYL     Take 10 mg by mouth        diphenoxylate-atropine 2.5-0.025 MG per tablet    LOMOTIL          ferrous sulfate 325 (65 FE) MG tablet    IRON     Take 325 mg by mouth        loperamide 2 MG capsule    IMODIUM     Take 2 mg by mouth as needed for diarrhea        metoprolol 25 MG 24 hr tablet    TOPROL-XL          METOPROLOL TARTRATE PO      Take 12.5 mg by mouth 2 times daily        PANTOPRAZOLE SODIUM PO      Take 40 mg by mouth every morning (before breakfast)        rifaximin 550 MG Tabs tablet    XIFAXAN    60 tablet    Take 1 tablet (550 mg) by mouth 2 times daily    Hepatic encephalopathy (H)

## 2017-12-21 NOTE — MR AVS SNAPSHOT
After Visit Summary   12/21/2017    Rashi Schroeder    MRN: [id number]           Patient Information     Date Of Birth          1950        Visit Information        Provider Department      12/21/2017 4:00 PM Weight, Rafael Saenz MD [facility] Prostate [facility] Urologic Cancers        Today's Diagnoses     Bladder cancer (H)    -  1      Care Instructions    We will be in contact with you with your cytology results in 7-10 business days.    It was a pleasure meeting with you today.  Thank you for allowing me and my team the privilege of caring for you today.  YOU are the reason we are here, and I truly hope we provided you with the excellent service you deserve.  Please let us know if there is anything else we can do for you so that we can be sure you are leaving completely satisfied with your care experience.                  Follow-ups after your visit        Your next 10 appointments already scheduled     Feb 12, 2018  8:30 AM CST   Lab with  LAB   [facility] ([facility])    [address] [place] [address]  [address] [facility] [address] [phone]            Feb 12, 2018  9:20 AM CST   (Arrive by 9:05 AM)   Return Liver Transplant with Felicia Alas MD   [facility] ([facility])    [address] [place] [address]  [address] [facility] [address] [phone]              Future tests that were ordered for you today     Open Future Orders        Priority Expected Expires Ordered    Follow-Up with Advanced Heart Failure Clinic Routine 8/21/2018 12/21/2018 12/21/2017    Echocardiogram Complete Routine 8/21/2018 12/21/2018 12/21/2017    Echocardiogram Complete Routine 2/12/2018 12/21/2018 12/21/2017    ECHO STRESS DOBUTAMINE WITH OPTISON Routine  10/18/2018 10/18/2017            Who to contact     Please call your clinic at [phone] to:    Ask questions about your health    Make or cancel appointments    Discuss your  medicines    Learn about your test results    Speak to your doctor   If you have compliments or concerns about an experience at your clinic, or if you wish to file a complaint, please contact [facility] Patient Relations at [phone] or email us at [email or web address]         Additional Information About Your Visit        Pharnexthart Information     [employer] gives you secure access to your electronic health record. If you see a primary care provider, you can also send messages to your care team and make appointments. If you have questions, please call your primary care clinic.  If you do not have a primary care provider, please call [phone] and they will assist you.      [employer] is an electronic gateway that provides easy, online access to your medical records. With [employer], you can request a clinic appointment, read your test results, renew a prescription or communicate with your care team.     To access your existing account, please contact your [facility] or call [phone] for assistance.        Care EveryWhere ID     This is your Care EveryWhere ID. This could be used by other organizations to access your [place] medical records  [id number]        Your Vitals Were     Pulse                   74            Blood Pressure from Last 3 Encounters:   12/21/17 118/63   12/21/17 120/71   12/21/17 92/54    Weight from Last 3 Encounters:   12/21/17 100.6 kg (221 lb 12.8 oz)   12/21/17 101.6 kg (224 lb)   10/17/17 91.4 kg (201 lb 8 oz)              We Performed the Following     Cytology non gyn        Primary Care Provider Office Phone # Fax #    Gume Armstrong [phone]       [facility] [address] [place] [address]        Equal Access to Services     SEAN SNYDER : richelle Arora, sterling gibbons. So [facility]  [phone].    ATENCIÓN: Si azeem tam, tiene a delgado disposición servicios gratuitos de asistencia lingüística. Paulie swanson [phone].    We comply with applicable federal civil rights laws and [place] laws. We do not discriminate on the basis of race, color, national origin, age, disability, sex, sexual orientation, or gender identity.            Thank you!     Thank you for choosing [facility] UROLOGY AND [facility] FOR PROSTATE AND UROLOGIC CANCERS  for your care. Our goal is always to provide you with excellent care. Hearing back from our patients is one way we can continue to improve our services. Please take a few minutes to complete the written survey that you may receive in the mail after your visit with us. Thank you!             Your Updated Medication List - Protect others around you: Learn how to safely use, store and throw away your medicines at www.disposemymeds.org.          This list is accurate as of: 12/21/17  4:21 PM.  Always use your most recent med list.                   Brand Name Dispense Instructions for use Diagnosis    acetaminophen 500 MG Caps           acidophilus Caps           albuterol 108 (90 BASE) MCG/ACT Inhaler    PROAIR HFA/PROVENTIL HFA/VENTOLIN HFA     Inhale 2 puffs into the lungs        atorvastatin 80 MG tablet    LIPITOR     TAKE 1 TABLET BY MOUTH AT BEDTIME.        INDIRA CONTOUR NEXT test strip   Generic drug:  blood glucose monitoring           budesonide-formoterol 160-4.5 MCG/ACT Inhaler    SYMBICORT     Inhale 1 puff into the lungs        dicyclomine 10 MG capsule    BENTYL     Take 10 mg by mouth        diphenoxylate-atropine 2.5-0.025 MG per tablet    LOMOTIL          ferrous sulfate 325 (65 FE) MG tablet    IRON     Take 325 mg by mouth        loperamide 2 MG capsule    IMODIUM     Take 2 mg by mouth as needed for diarrhea        metoprolol 25 MG 24 hr tablet    TOPROL-XL          METOPROLOL TARTRATE PO      Take 12.5 mg by mouth 2 times daily        PANTOPRAZOLE  SODIUM PO      Take 40 mg by mouth every morning (before breakfast)        rifaximin 550 MG Tabs tablet    XIFAXAN    60 tablet    Take 1 tablet (550 mg) by mouth 2 times daily    Hepatic encephalopathy (H)

## 2017-12-21 NOTE — PATIENT INSTRUCTIONS
We will be in contact with you with your cytology results in 7-10 business days.    It was a pleasure meeting with you today.  Thank you for allowing me and my team the privilege of caring for you today.  YOU are the reason we are here, and I truly hope we provided you with the excellent service you deserve.  Please let us know if there is anything else we can do for you so that we can be sure you are leaving completely satisfied with your care experience.

## 2017-12-21 NOTE — Clinical Note
Kathleen: psg 11pm start, needs pfts 1st. Pt needs to know how to schedule.   Gilbert: talk to me about nighttime commode needs, etc...

## 2017-12-21 NOTE — PROGRESS NOTES
"Allergies:    Allergies   Allergen Reactions     Latex      Per patient: Unknown.       Medications:    Current Outpatient Prescriptions   Medication Sig Dispense Refill     acetaminophen 500 MG CAPS        atorvastatin (LIPITOR) 80 MG tablet TAKE 1 TABLET BY MOUTH AT BEDTIME.       blood glucose monitoring (INDIRA CONTOUR NEXT) test strip        budesonide-formoterol (SYMBICORT) 160-4.5 MCG/ACT Inhaler Inhale 1 puff into the lungs       dicyclomine (BENTYL) 10 MG capsule Take 10 mg by mouth       diphenoxylate-atropine (LOMOTIL) 2.5-0.025 MG per tablet        ferrous sulfate (IRON) 325 (65 FE) MG tablet Take 325 mg by mouth       metoprolol (TOPROL-XL) 25 MG 24 hr tablet        albuterol (PROAIR HFA/PROVENTIL HFA/VENTOLIN HFA) 108 (90 BASE) MCG/ACT Inhaler Inhale 2 puffs into the lungs       Lactobacillus (ACIDOPHILUS) CAPS        rifaximin (XIFAXAN) 550 MG TABS tablet Take 1 tablet (550 mg) by mouth 2 times daily 60 tablet 3     METOPROLOL TARTRATE PO Take 12.5 mg by mouth 2 times daily       loperamide (IMODIUM) 2 MG capsule Take 2 mg by mouth as needed for diarrhea       PANTOPRAZOLE SODIUM PO Take 40 mg by mouth every morning (before breakfast)         Problem List:  Patient Active Problem List    Diagnosis Date Noted     HUDSON (nonalcoholic steatohepatitis) 09/22/2017     Priority: Medium        Past Medical/Surgical History:  Past Medical History:   Diagnosis Date     CAD (coronary artery disease) stenting     MI (myocardial infarction) 2014     Narcolepsy      HUDSON (nonalcoholic steatohepatitis) 9/22/2017     RADAMES (obstructive sleep apnea)      Tubular adenoma 01/25/2017    4 mm, care everywhere. Due 2022     Urinary bladder neoplasm      History reviewed. No pertinent surgical history.        Physical Examination:  Vitals: BP 92/54  Pulse 70  Resp 16  Ht 1.803 m (5' 10.98\")  Wt 101.6 kg (224 lb)  SpO2 98%  BMI 31.26 kg/m2  BMI= Body mass index is 31.26 kg/(m^2).    Neck Cir (cm): 38 cm    [place] Total " Score 12/21/2017   Total score - [place] 18       GENERAL APPEARANCE: alert, no distress and cooperative  EYES: Eyes grossly normal to inspection  HENT: oropharynx crowded, uvula elongated, soft palate dependent with redundant tissue, tongue base enlarged and nares with nasal valve collapse on L  NECK: thyroid normal to palpation  RESP: lungs clear to auscultation with exception of RLL, minimal breath sounds in RLL  CV: regular rates and rhythm, normal S1 S2, no S3 or S4 and 3/6 systolic murmur heard best in aortic region  Musculoskeletal:Moves without difficulty  Mallampati Class: IV.  Tonsillar Stage: 1  hidden by pillars.  Dental: Dentition in good condition.  Good advancement of lower jaw without tmd  Skin: without facial rash

## 2017-12-21 NOTE — MR AVS SNAPSHOT
"              After Visit Summary   12/21/2017    Rashi Schroeder    MRN: [id number]           Patient Information     Date Of Birth          1950        Visit Information        Provider Department      12/21/2017 10:00 AM Joann Ruvalcaba APRN [facility], Sleep Study        Care Instructions    MY TREATMENT INFORMATION FOR SLEEP APNEA-  Rashi Schroeder    DOCTOR : Joann Ruvalcaba  SLEEP CENTER :      MY CONTACT NUMBER:     Am I having a sleep study at a sleep center?  Make sure you have an appointment for the study before you leave!    Am I having a home sleep study?  Watch this video:  https://www.[employer].com/watch?v=CteI_GhyP9g&[email or web address]    Frequently asked questions:  1. What is Obstructive Sleep Apnea (RADAMES)? RADAMES is the most common type of sleep apnea. Apnea means, \"without breath.\"  Apnea is most often caused by narrowing or collapse of the upper airway as muscles relax during sleep.   Almost everyone has occasional apneas. Most people with sleep apnea have had brief interruptions at night frequently for many years.  The severity of sleep apnea is related to how frequent and severe the events are.   2. What are the consequences of RADAMES? Symptoms include: feeling sleepy during the day, snoring loudly, gasping or stopping of breathing, trouble sleeping, and occasionally morning headaches or heartburn at night.  Sleepiness can be serious and even increase the risk of falling asleep while driving. Other health consequences may include development of high blood pressure and other cardiovascular disease in persons who are susceptible. Untreated RADAMES  can contribute to heart disease, stroke and diabetes.   3. What are the treatment options? In most situations, sleep apnea is a lifelong disease that must be managed with daily therapy. Medications are not effective for sleep apnea and surgery is generally not considered until other therapies have been tried. Your " treatment is your choice . Continuous Positive Airway (CPAP) works right away and is the therapy that is effective in nearly everyone. An oral device to hold your jaw forward is usually the next most reliable option. Other options include postioning devices (to keep you off your back), weight loss, and surgery including a tongue pacing device. There is more detail about some of these options below.    Important tips for using CPAP and similar devices   Know your equipment:  CPAP is continuous positive airway pressure that prevents obstructive sleep apnea by keeping the throat from collapsing while you are sleeping. In most cases, the device is  smart  and can slowly self-adjusts if your throat collapses and keeps a record every day of how well you are treated-this information is available to you and your care team.  BPAP is bilevel positive airway pressure that keeps your throat open and also assists each breath with a pressure boost to maintain adequate breathing.  Special kinds of BPAP are used in patients who have inadequate breathing from lung or heart disease. In most cases, the device is  smart  and can slowly self-adjusts to assist breathing. Like CPAP, the device keeps a record of how well you are treated.  Your mask is your connection to the device. You get to choose what feels most comfortable and the staff will help to make sure if fits. Here: are some examples of the different masks that are available:       Key points to remember on your journey with sleep apnea:  1. Sleep study.  PAP devices often need to be adjusted during a sleep study to show that they are effective and adjusted right.  2. Good tips to remember: Try wearing just the mask during a quiet time during the day so your body adapts to wearing it. A humidifier is recommended for comfort in most cases to prevent drying of your nose and throat. Allergy medication from your provider may help you if you are having nasal congestion.  3. Getting  settled-in. It takes more than one night for most of us to get used to wearing a mask. Try wearing just the mask during a quiet time during the day so your body adapts to wearing it. A humidifier is recommended for comfort in most cases. Our team will work with you carefully on the first day and will be in contact within 4 days and again at 2 and 4 weeks for advice and remote device adjustments. Your therapy is evaluated by the device each day.   4. Use it every night. The more you are able to sleep naturally for 7-8 hours, the more likely you will have good sleep and to prevent health risks or symptoms from sleep apnea. Even if you use it 4 hours it helps. Occasionally all of us are unable to use a medical therapy, in sleep apnea, it is not dangerous to miss one night.   5. Communicate. Call our skilled team on the number provided on the first day if your visit for problems that make it difficult to wear the device. Over 2 out of 3 patients can learn to wear the device long-term with help from our team. Remember to call our team or your sleep providers if you are unable to wear the device as we may have other solutions for those who cannot adapt to mask CPAP therapy. It is recommended that you sleep your sleep provider within the first 3 months and yearly after that if you are not having problems.   Take care of your equipment. Make sure you clean your mask and tubing using directions every day and that your filter and mask are replaced as recommended or if they are not working.     BESIDES CPAP, WHAT OTHER THERAPIES ARE THERE?    Positioning Device  Positioning devices are generally used when sleep apnea is mild and only occurs on your back.This example shows a pillow that straps around the waist. It may be appropriate for those whose sleep study shows milder sleep apnea that occurs primarily when lying flat on one's back. Preliminary studies have shown benefit but effectiveness at home may need to be verified by  a home sleep test. These devices are generally not covered by medical insurance.    Positioning Device  Positioning devices are generally used when sleep apnea is mild and only occurs on your back.This example shows a pillow that straps around the waist. It may be appropriate for those whose sleep study shows milder sleep apnea that occurs primarily when lying flat on one's back. Preliminary studies have shown benefit but effectiveness at home may need to be verified by a home sleep test. These devices are generally not covered by medical insurance.           ebay slumber bump pillow; or backpack w/ chest strap stuffed w/ pillow or t shirt 2 / pockets sew in tennis balls  Examples of devices that maintain sleeping on the back to prevent snoring and mild sleep apnea.    Belt type body positioner  Http://SCYNEXIS/    Electronic reminder  Http://nightshifttherapy.com/  Http://www.[employer]egra technologies.[employer].au/    Oral Appliance  What is oral appliance therapy?  An oral appliance device fits on your teeth at night like a retainer used after having braces. The device is made by a specialized dentist and requires several visits over 1-2 months before a manufactured device is made to fit your teeth and is adjusted to prevent your sleep apnea. Once an oral device is working properly, snoring should be improved. A home sleep test may be recommended at that time if to determine whether the sleep apnea is adequately treated.       Some things to remember:  -Oral devices are often, but not always, covered by your medical insurance. Be sure to check with your insurance provider.   -If you are referred for oral therapy, you will be given a list of specialized dentists to consider or you may choose to visit the Web site of the American Academy of Dental Sleep Medicine  -Oral devices are less likely to work if you have severe sleep apnea or are extremely overweight.     More detailed information  An oral appliance is a small acrylic device  that fits over the upper and lower teeth  (similar to a retainer or a mouth guard). This device slightly moves jaw forward, which moves the base of the tongue forward, opens the airway, improves breathing for effective treat snoring and obstructive sleep apnea in perhaps 7 out of 10 people .  The best working devices are custom-made by a dental device  after a mold is made of the teeth 1, 2, 3.  When is an oral appliance indicated?  Oral appliance therapy is recommended as a first-line treatment for patients with primary snoring, mild sleep apnea, and for patients with moderate sleep apnea who prefer appliance therapy to use of CPAP4, 5. Severity of sleep apnea is determined by sleep testing and is based on the number of respiratory events per hour of sleep.   How successful is oral appliance therapy?  The success rate of oral appliance therapy in patients with mild sleep apnea is 75-80% while in patients with moderate sleep apnea it is 50-70%. The chance of success in patients with severe sleep apnea is 40-50%. The research also shows that oral appliances have a beneficial effect on the cardiovascular health of RADAMES patients at the same magnitude as CPAP therapy7.  Oral appliances should be a second-line treatment in cases of severe sleep apnea, but if not completely successful then a combination therapy utilizing CPAP plus oral appliance therapy may be effective. Oral appliances tend to be effective in a broad range of patients although studies show that the patients who have the highest success are females, younger patients, those with milder disease, and less severe obesity. 3, 6.   Finding a dentist that practices dental sleep medicine  Specific training is available through the American Academy of Dental Sleep Medicine for dentists interested in working in the field of sleep. To find a dentist who is educated in the field of sleep and the use of oral appliances, near you, visit the Web site of the  American Academy of Dental Sleep Medicine.    References  1. Katya et al. Objectively measured vs self-reported compliance during oral appliance therapy for sleep-disordered breathing. Chest 2013; 144(5): 3810-1099.  2. Lazaro et al. Objective measurement of compliance during oral appliance therapy for sleep-disordered breathing. Thorax 2013; 68(1): 91-96.  3. Edu, et al. Mandibular advancement devices in 620 men and women with RADAMES and snoring: tolerability and predictors of treatment success. Chest 2004; 125: 6871-8311.  4. Marta et al. Oral appliances for snoring and RADAMES: a review. Sleep 2006; 29: 244-262.  5. Cate et al. Oral appliance treatment for RADAMES: an update. J Clin Sleep Med 2014; 10(2): 215-227.  6. Judith et al. Predictors of OSAH treatment outcome. J Dent Res 2007; 86: 9063-6380.      Weight Loss:    Weight loss is a long-term strategy that may improve sleep apnea in some patients.    Weight management is a personal decision.  If you are interested in exploring weight loss strategies, the following discussion covers the impact on weight loss on sleep apnea and the approaches that may be successful.    Weight loss decreases severity of sleep apnea in most people with obesity. For those with mild obesity who have developed snoring with weight gain, even 15-30 pound weight loss can improve and occasionally eliminate sleep apnea.  Structured and life-long dietary and health habits are necessary to lose weight and keep healthier weight levels.     Though there may be significant health benefits from weight loss, long-term weight loss is very difficult to achieve- studies show success with dietary management in less than 10% of people. In addition, substantial weight loss may require years of dietary control and may be difficult if patients have severe obesity. In these cases, surgical management may be considered.  Finally, older individuals who have tolerated obesity without  health complications may be less likely to benefit from weight loss strategies.      [unfilled]    Surgery:    Surgery for obstructive sleep apnea is considered generally only when other therapies fail to work. Surgery may be discussed with you if you are having a difficult time tolerating CPAP and or when there is an abnormal structure that requires surgical correction.  Nose and throat surgeries often enlarge the airway to prevent collapse.  Most of these surgeries create pain for 1-2 weeks and up to half of the most common surgeries are not effective throughout life.  You should carefully discuss the benefits and drawbacks to surgery with your sleep provider and surgeon to determine if it is the best solution for you.   More information  Surgery for RADAMES is directed at areas that are responsible for narrowing or complete obstruction of the airway during sleep.  There are a wide range of procedures available to enlarge and/or stabilize the airway to prevent blockage of breathing in the three major areas where it can occur: the palate, tongue, and nasal regions.  Successful surgical treatment depends on the accurate identification of the factors responsible for obstructive sleep apnea in each person.  A personalized approach is required because there is no single treatment that works well for everyone.  Because of anatomic variation, consultation with an examination by a sleep surgeon is a critical first step in determining what surgical options are best for each patient.  In some cases, examination during sedation may be recommended in order to guide the selection of procedures.  Patients will be counseled about risks and benefits as well as the typical recovery course after surgery. Surgery is typically not a cure for a person s RADAMES.  However, surgery will often significantly improve one s RADAMES severity (termed  success rate ).  Even in the absence of a cure, surgery will decrease the cardiovascular risk associated  with OSA7; improve overall quality of life8 (sleepiness, functionality, sleep quality, etc).      Palate Procedures:  Patients with RADAMES often have narrowing of their airway in the region of their tonsils and uvula.  The goals of palate procedures are to widen the airway in this region as well as to help the tissues resist collapse.  Modern palate procedure techniques focus on tissue conservation and soft tissue rearrangement, rather than tissue removal.  Often the uvula is preserved in this procedure. Residual sleep apnea is common in patient after pharyngoplasty with an average reduction in sleep apnea events of 33%2.      Tongue Procedures:  ExamWhile patients are awake, the muscles that surround the throat are active and keep this region open for breathing. These muscles relax during sleep, allowing the tongue and other structures to collapse and block breathing.  There are several different tongue procedures available.  Selection of a tongue base procedure depends on characteristics seen on physical exam.  Generally, procedures are aimed at removing bulky tissues in this area or preventing the back of the tongue from falling back during sleep.  Success rates for tongue surgery range from 50-62%3.    Hypoglossal Nerve Stimulation:  Hypoglossal nerve stimulation has recently received approval from the [place] Food and Drug Administration for the treatment of obstructive sleep apnea.  This is based on research showing that the system was safe and effective in treating sleep apnea6.  Results showed that the median AHI score decreased 68%, from 29.3 to 9.0. This therapy uses an implant system that senses breathing patterns and delivers mild stimulation to airway muscles, which keeps the airway open during sleep.  The system consists of three fully implanted components: a small generator (similar in size to a pacemaker), a breathing sensor, and a stimulation lead.  Using a small handheld remote, a patient turns  the therapy on before bed and off upon awakening.    Candidates for this device must be greater than 22 years of age, have moderate to severe RADAMES (AHI between 20-65), BMI less than 32, have tried CPAP/oral appliance without success, and have appropriate upper airway anatomy (determined by a sleep endoscopy performed by Dr. Lim).    Hypoglossal Nerve Stimulation Pathway:    The sleep surgeon s office will work with the patient through the insurance prior-authorization process (including communications and appeals).    Nasal Procedures:  Nasal obstruction can interfere with nasal breathing during the day and night.  Studies have shown that relief of nasal obstruction can improve the ability of some patients to tolerate positive airway pressure therapy for obstructive sleep apnea1.  Treatment options include medications such as nasal saline, topical corticosteroid and antihistamine sprays, and oral medications such as antihistamines or decongestants. Non-surgical treatments can include external nasal dilators for selected patients. If these are not successful by themselves, surgery can improve the nasal airway either alone or in combination with these other options.      Combination Procedures:  Combination of surgical procedures and other treatments may be recommended, particularly if patients have more than one area of narrowing or persistent positional disease.  The success rate of combination surgery ranges from 66-80%2,3.    References  1. Anna NGUYEN. The Role of the Nose in Snoring and Obstructive Sleep Apnoea: An Update.  Eur Arch Otorhinolaryngol. 2011; 268: 1365-73.  2.  Fab SM; Alyce JA; Sara JR; Pallanch JF; Gregg MB; Alex SG; Glo WIN. Surgical modifications of the upper airway for obstructive sleep apnea in adults: a systematic review and meta-analysis. SLEEP 2010;33(10):5882-7076. Ayo ARRIAZA. Hypopharyngeal surgery in obstructive sleep apnea: an evidence-based medicine review.  Arch  Otolaryngol Head Neck Surg. 2006 Feb;132(2):206-13.  3. Robert YH1, Alize Y, Arnaud YOSSI. The efficacy of anatomically based multilevel surgery for obstructive sleep apnea. Otolaryngol Head Neck Surg. 2003 Oct;129(4):327-35.  4. Kezirian E, Goldberg A. Hypopharyngeal Surgery in Obstructive Sleep Apnea: An Evidence-Based Medicine Review. Arch Otolaryngol Head Neck Surg. 2006 Feb;132(2):206-13.  5. Oleksandr NAVA et al. Upper-Airway Stimulation for Obstructive Sleep Apnea.  N Engl J Med. 2014 Jan 9;370(2):139-49.  6. Carlene Y et al. Increased Incidence of Cardiovascular Disease in Middle-aged Men with Obstructive Sleep Apnea. Am J Respir Crit Care Med; 2002 166: 159-165  7. Kassandra SHAW et al. Studying Life Effects and Effectiveness of Palatopharyngoplasty (SLEEP) study: Subjective Outcomes of Isolated Uvulopalatopharyngoplasty. Otolaryngol Head Neck Surg. 2011; 144: 623-631.    Your BMI is Body mass index is 31.26 kg/(m^2).  Weight management is a personal decision.  If you are interested in exploring weight loss strategies, the following discussion covers the approaches that may be successful. Body mass index (BMI) is one way to tell whether you are at a healthy weight, overweight, or obese. It measures your weight in relation to your height.  A BMI of 18.5 to 24.9 is in the healthy range. A person with a BMI of 25 to 29.9 is considered overweight, and someone with a BMI of 30 or greater is considered obese. More than two-thirds of American adults are considered overweight or obese.  Being overweight or obese increases the risk for further weight gain. Excess weight may lead to heart disease and diabetes.  Creating and following plans for healthy eating and physical activity may help you improve your health.  Weight control is part of healthy lifestyle and includes exercise, emotional health, and healthy eating habits. Careful eating habits lifelong are the mainstay of weight control. Though there are significant health benefits  from weight loss, long-term weight loss with diet alone may be very difficult to achieve- studies show long-term success with dietary management in less than 10% of people. Attaining a healthy weight may be especially difficult to achieve in those with severe obesity. In some cases, medications, devices and surgical management might be considered.  What can you do?  If you are overweight or obese and are interested in methods for weight loss, you should discuss this with your provider.     Consider reducing daily calorie intake by 500 calories.     Keep a food journal.     Avoiding skipping meals, consider cutting portions instead.    Diet combined with exercise helps maintain muscle while optimizing fat loss. Strength training is particularly important for building and maintaining muscle mass. Exercise helps reduce stress, increase energy, and improves fitness. Increasing exercise without diet control, however, may not burn enough calories to loose weight.       Start walking three days a week 10-20 minutes at a time    Work towards walking thirty minutes five days a week     Eventually, increase the speed of your walking for 1-2 minutes at time    In addition, we recommend that you review healthy lifestyles and methods for weight loss available through the National Institutes of Health patient information sites:  http://win.niddk.nih.gov/publications/index.htm    And look into health and wellness programs that may be available through your health insurance provider, employer, local community center, or tima club.    Weight management plan: Patient was referred to their PCP to discuss a diet and exercise plan.    Plan is sleep study, in lab, with breathing tests prior.              Follow-ups after your visit        Your next 10 appointments already scheduled     Dec 21, 2017  1:30 PM CST   Transplant Class-Liver with [facility] TRANSPLANT CLASS   [facility] Transplant ([facility])     [address]  [address] [facility] [address]   [phone]            Dec 21, 2017  2:30 PM CST   NUTRITION VISIT with Magi Baez RD   [facility] Solid Organ Transplant ([facility])    [address] [place] [address]  [address] [facility] [address]   [phone]            Dec 21, 2017  3:00 PM CST   (Arrive by 2:45 PM)   NEW LIVER EVALUATION with Heron Harkins MD   [facility] Heart Care ([facility])    [address] [place] [address]  [address] [facility] [id number] [phone]            Dec 21, 2017  4:00 PM CST   (Arrive by 3:45 PM)   Bladder Cancer with Rafael Ahuja MD   [facility] Urology and [facility] Pro[facility]rolog[facility] ([facility])    [address] [place] [address]  [address] [facility] [address] [phone]            Feb 12, 2018  8:30 AM CST   Lab with UC LAB   [facility] Lab ([facility])    [address] [place] [address]   [phone]            Feb 12, 2018  9:20 AM CST   (Arrive by 9:05 AM)   Return Liver Transplant with Felicia Alas MD   [facility] Hepatology ([facility])    [address] [place] [address]   [phone]              Future tests that were ordered for you today     Open Future Orders        Priority Expected Expires Ordered    DX Wrist Heel Radius Routine  12/21/2018 12/21/2017    ECHO STRESS DOBUTAMINE WITH OPTISON Routine  10/18/2018 10/18/2017            Who to contact     If you have questions or need follow up information about today's clinic visit or your schedule please contact [facility], [facility], SLEEP STUDY directly at [phone].  Normal or non-critical lab and imaging results will be communicated to you by MyChart, letter or phone within 4 business days after the clinic has received the results. If you do not hear from us within 7 days, please contact the  "clinic through [employer] or phone. If you have a critical or abnormal lab result, we will notify you by phone as soon as possible.  Submit refill requests through [employer] or call your pharmacy and they will forward the refill request to us. Please allow 3 business days for your refill to be completed.          Additional Information About Your Visit        [employer] Information     [employer] gives you secure access to your electronic health record. If you see a primary care provider, you can also send messages to your care team and make appointments. If you have questions, please call your primary care clinic.  If you do not have a primary care provider, please call [phone] and they will assist you.        Care EveryWhere ID     This is your Care EveryWhere ID. This could be used by other organizations to access your [place] medical records  [id number]        Your Vitals Were     Pulse Respirations Height Pulse Oximetry BMI (Body Mass Index)       70 16 1.803 m (5' 10.98\") 98% 31.26 kg/m2        Blood Pressure from Last 3 Encounters:   12/21/17 92/54   10/17/17 123/78    Weight from Last 3 Encounters:   12/21/17 101.6 kg (224 lb)   10/17/17 91.4 kg (201 lb 8 oz)   09/19/17 86.2 kg (190 lb)              Today, you had the following     No orders found for display       Primary Care Provider Office Phone # Fax #    Gume Armstrong [phone]       [facility] [address] [place] [address]        Equal Access to Services     [facility] : Hadii aad ku hadasho Soomaali, waaxda luqadaha, qaybta kaalmada adeegyada, sterling atkinson hayjustine escobar . So [facility] [phone].    ATENCIÓN: Si habla español, tiene a delgado disposición servicios gratuitos de asistencia lingüística. Llame al [phone].    We comply with applicable federal civil rights laws and [place] laws. We do not discriminate on the basis of race, color, national origin, age, disability, sex, sexual orientation, or " gender identity.            Thank you!     Thank you for choosing [facility] [place], [facility]  for your care. Our goal is always to provide you with excellent care. Hearing back from our patients is one way we can continue to improve our services. Please take a few minutes to complete the written survey that you may receive in the mail after your visit with us. Thank you!             Your Updated Medication List - Protect others around you: Learn how to safely use, store and throw away your medicines at www.disposemymeds.org.          This list is accurate as of: 12/21/17 11:08 AM.  Always use your most recent med list.                   Brand Name Dispense Instructions for use Diagnosis    acetaminophen 500 MG Caps           acidophilus Caps           albuterol 108 (90 BASE) MCG/ACT Inhaler    PROAIR HFA/PROVENTIL HFA/VENTOLIN HFA     Inhale 2 puffs into the lungs        atorvastatin 80 MG tablet    LIPITOR     TAKE 1 TABLET BY MOUTH AT BEDTIME.        INDIRA CONTOUR NEXT test strip   Generic drug:  blood glucose monitoring           budesonide-formoterol 160-4.5 MCG/ACT Inhaler    SYMBICORT     Inhale 1 puff into the lungs        dicyclomine 10 MG capsule    BENTYL     Take 10 mg by mouth        diphenoxylate-atropine 2.5-0.025 MG per tablet    LOMOTIL          ferrous sulfate 325 (65 FE) MG tablet    IRON     Take 325 mg by mouth        loperamide 2 MG capsule    IMODIUM     Take 2 mg by mouth as needed for diarrhea        metoprolol 25 MG 24 hr tablet    TOPROL-XL          METOPROLOL TARTRATE PO      Take 12.5 mg by mouth 2 times daily        PANTOPRAZOLE SODIUM PO      Take 40 mg by mouth every morning (before breakfast)        rifaximin 550 MG Tabs tablet    XIFAXAN    60 tablet    Take 1 tablet (550 mg) by mouth 2 times daily    Hepatic encephalopathy (H)

## 2017-12-21 NOTE — NURSING NOTE
Chief Complaint   Patient presents with     New Patient     pre liver transplant cardiac eval     Vitals were taken and medications were reconciled.     Amita Garvin MA    3:06 PM

## 2017-12-21 NOTE — LETTER
"12/21/2017      RE: Rashi Schroeder  [address] [place] [id number]       Dear Colleague,    Thank you for the opportunity to participate in the care of your patient, Rashi Schroeder, at the [facility]. Please see a copy of my visit note below.    Heron Harkins M.D.  Cardiovascular Medicine    I personally saw and examined this patient, discussed care with housestaff and other consultants, reviewed current laboratories and imaging studies, and conveyed impression and diagnostic/therapeutic plan to patient.    Problem List  1. ASHD with stenting  2. Chronic liver failure/HUDSON  3. Bladder tumor/surgically removed  4. Narcolepsy  5. Hyperlipidemia  6. Family history of HUDSON   7. Sleep apnea untreated  8. Hypoproteinemia  9. 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     From a cardiovascular point of view the patient has history of ASHD with previous stenting with current stress test without evidence of active ischemia.  The patient has no history of exertional syncope, pre-syncope, symptoms of right or left ventricular failure, ASPVD with claudication.  He has no history of DVT/PE, collagen vascular disease, congenital heart disease, myeloproliferative syndrome or other disease states associated with pulmonary hypertension.  His echocardiogram does slow evidence of aortic valve calcification with modest gradient and no history of symptoms suggestive of aortic valve disease.      Objective  /71 (BP Location: Right arm, Patient Position: Chair, Cuff Size: Adult Regular)  Pulse 70  Ht 1.803 m (5' 11\")  Wt 100.6 kg (221 lb 12.8 oz)  SpO2 98%  BMI 30.93 kg/m2   Constitutional: alert, oriented, normal gait and station, normal mentation.  Oral: moist mucous membrans  Lymph: without pathologic adenopathy  Chest: clear to ausculation and " percussion  Cor: No evidence of left or right ventricular activity.  Rhythm is regular.  S1 normal, S2 split physiologically. Soft systolic murmur across aortic valve without gallop, lift, diminished tone of second heart tone  Abdomen: without tenderness, rebound, guarding, masses, but possible ascites  Extremities: Edema not present  Neuro: no focal defects, normal mentation  Skin: without open lesions  Psych: oriented, verbal, mental status in tact    Wt Readings from Last 5 Encounters:   12/21/17 100.6 kg (221 lb 12.8 oz)   12/21/17 101.6 kg (224 lb)   10/17/17 91.4 kg (201 lb 8 oz)   09/19/17 86.2 kg (190 lb)       Meds  Current Outpatient Prescriptions   Medication     acetaminophen 500 MG CAPS     atorvastatin (LIPITOR) 80 MG tablet     budesonide-formoterol (SYMBICORT) 160-4.5 MCG/ACT Inhaler     dicyclomine (BENTYL) 10 MG capsule     ferrous sulfate (IRON) 325 (65 FE) MG tablet     metoprolol (TOPROL-XL) 25 MG 24 hr tablet     albuterol (PROAIR HFA/PROVENTIL HFA/VENTOLIN HFA) 108 (90 BASE) MCG/ACT Inhaler     Lactobacillus (ACIDOPHILUS) CAPS     rifaximin (XIFAXAN) 550 MG TABS tablet     METOPROLOL TARTRATE PO     loperamide (IMODIUM) 2 MG capsule     PANTOPRAZOLE SODIUM PO     blood glucose monitoring (INDIRA CONTOUR NEXT) test strip     diphenoxylate-atropine (LOMOTIL) 2.5-0.025 MG per tablet     No current facility-administered medications for this visit.      Labs    Results for LESLIE GALDAMEZ (MRN [id number]) as of 12/21/2017 15:37   Ref. Range 12/20/2017 09:50 12/20/2017 09:51   Sodium Latest Ref Range: 133 - 144 mmol/L 142    Potassium Latest Ref Range: 3.4 - 5.3 mmol/L 3.6    Chloride Latest Ref Range: 94 - 109 mmol/L 112 (H)    Carbon Dioxide Latest Ref Range: 20 - 32 mmol/L 22    Urea Nitrogen Latest Ref Range: 7 - 30 mg/dL 8    Creatinine Latest Ref Range: 0.66 - 1.25 mg/dL 1.08    GFR Estimate Latest Ref Range: >60 mL/min/1.7m2 68    GFR Estimate If Black Latest Ref Range: >60 mL/min/1.7m2 82     Calcium Latest Ref Range: 8.5 - 10.1 mg/dL 7.3 (L)    Anion Gap Latest Ref Range: 3 - 14 mmol/L 9    Phosphorus Latest Ref Range: 2.5 - 4.5 mg/dL 2.5    Albumin Latest Ref Range: 3.4 - 5.0 g/dL 2.0 (L)    Protein Total Latest Ref Range: 6.8 - 8.8 g/dL 6.4 (L)    Bilirubin Total Latest Ref Range: 0.2 - 1.3 mg/dL 2.9 (H)    Alkaline Phosphatase Latest Ref Range: 40 - 150 U/L 128    ALT Latest Ref Range: 0 - 70 U/L 33    AST Latest Ref Range: 0 - 45 U/L 44    Alpha Fetoprotein Latest Ref Range: 0 - 8 ug/L 3.3    Bilirubin Direct Latest Ref Range: 0.0 - 0.2 mg/dL 0.7 (H)    PSA Latest Ref Range: 0 - 4 ug/L 0.21    TSH Latest Ref Range: 0.40 - 4.00 mU/L 1.09    Vitamin D Deficiency screening Latest Ref Range: 20 - 75 ug/L 8 (L)    Glucose Latest Ref Range: 70 - 99 mg/dL 108 (H)    WBC Latest Ref Range: 4.0 - 11.0 10e9/L 3.5 (L)    Hemoglobin Latest Ref Range: 13.3 - 17.7 g/dL 8.8 (L)    Hematocrit Latest Ref Range: 40.0 - 53.0 % 28.3 (L)    Platelet Count Latest Ref Range: 150 - 450 10e9/L 37 (LL)    RBC Count Latest Ref Range: 4.4 - 5.9 10e12/L 2.71 (L)    MCV Latest Ref Range: 78 - 100 fl 104 (H)    MCH Latest Ref Range: 26.5 - 33.0 pg 32.5    MCHC Latest Ref Range: 31.5 - 36.5 g/dL 31.1 (L)    RDW Latest Ref Range: 10.0 - 15.0 % 17.8 (H)    INR Latest Ref Range: 0.86 - 1.14  1.93 (H)    Antibody Titer Unknown  Anti A1 IgG>256...     Imaging   [facility],[place]  Echocardiography Laboratory  [address] [place] [address]     Name: LESLIE GALDAMEZ  MRN: [id number]  : 1950  Study Date: 2017 12:39 PM  Age: 67 yrs  Gender: Male  Patient [facility]: [facility]  Reason For Study: Nonalcoholic steatohepatitis (HUDSON), Cirrhosis (H), Portal  hyp  Ordering Physician: MATT BOYCE  Referring Physician: MATT BOYCE  Performed By: Jie Sun RDCS     BSA: 2.1 m2  Height: 71 in  Weight: 200 lb  BP: 121/69  mmHg  _____________________________________________________________________________  __     _____________________________________________________________________________  __        Interpretation Summary  Normal, low-risk dobutamine echocardiogram with slightly reduced sensitivity  due to hypotensive response to dobutamine. Calcified aortic valve with mild  aortic stenosis (peak velocity 2.9 m/s.)     The target heart rate was achieved.  Normal biventricular size, thickness, and global systolic function at  baseline, LVEF=60-65%.  With dobutamine, LVEF increased to >70% and LV cavity size decreased  appropriately.  No regional wall motion abnormalities at rest or with dobutamine.  No angina was elicited.  No ECG evidence of ischemia. Normal heart rate and hypotensive blood pressure  response to dobutamine.  Calcified aortic valve with mild aortic stenosis (peak velocity 2.9 m/s.)  The aortic root and visualized ascending aorta are normal.    Assessment/Plan   1. There is no specific contraindication to liver transplant from cardiac perspective save for need of estimation of pulmonary artery pressure  2. May consider antibiotics with cystoscopy if necessary  3. Should have repeat echocardiograms for assessment of aortic valve gradient and PA pressures at 6 moth intervals.        Sincerely,     Heron Harkins MD

## 2017-12-21 NOTE — PROGRESS NOTES
Liver Transplant Evaluation/Teaching    TEACHING TOPICS: Evaluation Process, Evaluation Items, Diagnostic Studies, Consultation, Chemical Dependency Policy, CD Eval, Donor Source, Liver Allocation, MELD System, UNOS, Waiting List, Follow up while on transplant list, Follow up after transplantation, Infection and Rejection, Immunosuppression , Medication Teaching, Lab Recording after transplant, Laboratory Frequency after transplant , Consent for evaluation and One year survival rates  INSTRUCTIONAL MATERIAL USED/GIVEN: Liver Transplant Handbook, MELD Booklet, Donor Booklet, Web Sites Options, Verbal instructions, Multiple Listing Brochure , Consent for evaluation signed, One year survival rates and SRTR (Scientific Registry) Data  Person(s) involved in teaching: Patient and wife  Asks Questions: YES  Eager to Learn: YES  Cooperative: YES  Receptive (willing/able to accept information): YES  Reason for the appointment, diagnosis and treatment plan:YES  Knowledge of proper use of medications and conditions for which they are ordered (with special attention to potential side effects or drug interactions): YES  Which situations necessitate calling provider and whom to contact:YES  Other: ED for GI bleed  Teaching Concerns Addressed   Comments: Patient attended class on 2017. Patient asked appropriate questions and verbalized understanding of the material.   Proper use and care of (medical equip, care aids, etc.):YES  Nutritional needs and diet plan: YES  Pain management techniques: YES  Wound Care: YES  How and/when to access community resources: YES  Patient is aware of and agrees to required commitment to post-op care and long term follow-up: YES  Patient has name and phone number of transplant coordinator.   Time Spent face-to-face teachin minutes.

## 2017-12-21 NOTE — NURSING NOTE
"Chief Complaint   Patient presents with     Consult       Initial BP 92/54  Pulse 70  Resp 16  Ht 1.803 m (5' 10.98\")  Wt 101.6 kg (224 lb)  SpO2 98%  BMI 31.26 kg/m2 Estimated body mass index is 31.26 kg/(m^2) as calculated from the following:    Height as of this encounter: 1.803 m (5' 10.98\").    Weight as of this encounter: 101.6 kg (224 lb).  Medication Reconciliation: complete   Flory Ward [facility]      "

## 2017-12-21 NOTE — PATIENT INSTRUCTIONS
"MY TREATMENT INFORMATION FOR SLEEP APNEA-  Rashi Schroeder    DOCTOR : Joann Ruvalcaba  SLEEP CENTER :      MY CONTACT NUMBER:     Am I having a sleep study at a sleep center?  Make sure you have an appointment for the study before you leave!    Am I having a home sleep study?  Watch this video:  https://www.Faves.com/watch?v=CteI_GhyP9g&list=[email or web address]    Frequently asked questions:  1. What is Obstructive Sleep Apnea (RADAMES)? RADAMES is the most common type of sleep apnea. Apnea means, \"without breath.\"  Apnea is most often caused by narrowing or collapse of the upper airway as muscles relax during sleep.   Almost everyone has occasional apneas. Most people with sleep apnea have had brief interruptions at night frequently for many years.  The severity of sleep apnea is related to how frequent and severe the events are.   2. What are the consequences of RADAMES? Symptoms include: feeling sleepy during the day, snoring loudly, gasping or stopping of breathing, trouble sleeping, and occasionally morning headaches or heartburn at night.  Sleepiness can be serious and even increase the risk of falling asleep while driving. Other health consequences may include development of high blood pressure and other cardiovascular disease in persons who are susceptible. Untreated RADAMES  can contribute to heart disease, stroke and diabetes.   3. What are the treatment options? In most situations, sleep apnea is a lifelong disease that must be managed with daily therapy. Medications are not effective for sleep apnea and surgery is generally not considered until other therapies have been tried. Your treatment is your choice . Continuous Positive Airway (CPAP) works right away and is the therapy that is effective in nearly everyone. An oral device to hold your jaw forward is usually the next most reliable option. Other options include postioning devices (to keep you off your back), weight loss, and surgery " including a tongue pacing device. There is more detail about some of these options below.    Important tips for using CPAP and similar devices   Know your equipment:  CPAP is continuous positive airway pressure that prevents obstructive sleep apnea by keeping the throat from collapsing while you are sleeping. In most cases, the device is  smart  and can slowly self-adjusts if your throat collapses and keeps a record every day of how well you are treated-this information is available to you and your care team.  BPAP is bilevel positive airway pressure that keeps your throat open and also assists each breath with a pressure boost to maintain adequate breathing.  Special kinds of BPAP are used in patients who have inadequate breathing from lung or heart disease. In most cases, the device is  smart  and can slowly self-adjusts to assist breathing. Like CPAP, the device keeps a record of how well you are treated.  Your mask is your connection to the device. You get to choose what feels most comfortable and the staff will help to make sure if fits. Here: are some examples of the different masks that are available:       Key points to remember on your journey with sleep apnea:  1. Sleep study.  PAP devices often need to be adjusted during a sleep study to show that they are effective and adjusted right.  2. Good tips to remember: Try wearing just the mask during a quiet time during the day so your body adapts to wearing it. A humidifier is recommended for comfort in most cases to prevent drying of your nose and throat. Allergy medication from your provider may help you if you are having nasal congestion.  3. Getting settled-in. It takes more than one night for most of us to get used to wearing a mask. Try wearing just the mask during a quiet time during the day so your body adapts to wearing it. A humidifier is recommended for comfort in most cases. Our team will work with you carefully on the first day and will be in  contact within 4 days and again at 2 and 4 weeks for advice and remote device adjustments. Your therapy is evaluated by the device each day.   4. Use it every night. The more you are able to sleep naturally for 7-8 hours, the more likely you will have good sleep and to prevent health risks or symptoms from sleep apnea. Even if you use it 4 hours it helps. Occasionally all of us are unable to use a medical therapy, in sleep apnea, it is not dangerous to miss one night.   5. Communicate. Call our skilled team on the number provided on the first day if your visit for problems that make it difficult to wear the device. Over 2 out of 3 patients can learn to wear the device long-term with help from our team. Remember to call our team or your sleep providers if you are unable to wear the device as we may have other solutions for those who cannot adapt to mask CPAP therapy. It is recommended that you sleep your sleep provider within the first 3 months and yearly after that if you are not having problems.   Take care of your equipment. Make sure you clean your mask and tubing using directions every day and that your filter and mask are replaced as recommended or if they are not working.     BESIDES CPAP, WHAT OTHER THERAPIES ARE THERE?    Positioning Device  Positioning devices are generally used when sleep apnea is mild and only occurs on your back.This example shows a pillow that straps around the waist. It may be appropriate for those whose sleep study shows milder sleep apnea that occurs primarily when lying flat on one's back. Preliminary studies have shown benefit but effectiveness at home may need to be verified by a home sleep test. These devices are generally not covered by medical insurance.    Positioning Device  Positioning devices are generally used when sleep apnea is mild and only occurs on your back.This example shows a pillow that straps around the waist. It may be appropriate for those whose sleep study  shows milder sleep apnea that occurs primarily when lying flat on one's back. Preliminary studies have shown benefit but effectiveness at home may need to be verified by a home sleep test. These devices are generally not covered by medical insurance.           ebay slumber bump pillow; or backpack w/ chest strap stuffed w/ pillow or t shirt 2 / pockets sew in tennis balls  Examples of devices that maintain sleeping on the back to prevent snoring and mild sleep apnea.    Belt type body positioner  Http://[email or web address]/    Electronic reminder  Http://nightshifttherapy.com/  Http://[email or web address].[place]land.au/    Oral Appliance  What is oral appliance therapy?  An oral appliance device fits on your teeth at night like a retainer used after having braces. The device is made by a specialized dentist and requires several visits over 1-2 months before a manufactured device is made to fit your teeth and is adjusted to prevent your sleep apnea. Once an oral device is working properly, snoring should be improved. A home sleep test may be recommended at that time if to determine whether the sleep apnea is adequately treated.       Some things to remember:  -Oral devices are often, but not always, covered by your medical insurance. Be sure to check with your insurance provider.   -If you are referred for oral therapy, you will be given a list of specialized dentists to consider or you may choose to visit the Web site of the American Academy of Dental Sleep Medicine  -Oral devices are less likely to work if you have severe sleep apnea or are extremely overweight.     More detailed information  An oral appliance is a small acrylic device that fits over the upper and lower teeth  (similar to a retainer or a mouth guard). This device slightly moves jaw forward, which moves the base of the tongue forward, opens the airway, improves breathing for effective treat snoring and obstructive sleep apnea in perhaps 7 out of 10 people .  The best  working devices are custom-made by a dental device  after a mold is made of the teeth 1, 2, 3.  When is an oral appliance indicated?  Oral appliance therapy is recommended as a first-line treatment for patients with primary snoring, mild sleep apnea, and for patients with moderate sleep apnea who prefer appliance therapy to use of CPAP4, 5. Severity of sleep apnea is determined by sleep testing and is based on the number of respiratory events per hour of sleep.   How successful is oral appliance therapy?  The success rate of oral appliance therapy in patients with mild sleep apnea is 75-80% while in patients with moderate sleep apnea it is 50-70%. The chance of success in patients with severe sleep apnea is 40-50%. The research also shows that oral appliances have a beneficial effect on the cardiovascular health of RADAMES patients at the same magnitude as CPAP therapy7.  Oral appliances should be a second-line treatment in cases of severe sleep apnea, but if not completely successful then a combination therapy utilizing CPAP plus oral appliance therapy may be effective. Oral appliances tend to be effective in a broad range of patients although studies show that the patients who have the highest success are females, younger patients, those with milder disease, and less severe obesity. 3, 6.   Finding a dentist that practices dental sleep medicine  Specific training is available through the American Academy of Dental Sleep Medicine for dentists interested in working in the field of sleep. To find a dentist who is educated in the field of sleep and the use of oral appliances, near you, visit the Web site of the American Academy of Dental Sleep Medicine.    References  1. Katya et al. Objectively measured vs self-reported compliance during oral appliance therapy for sleep-disordered breathing. Chest 2013; 144(5): 9271-8315.  2. Lazaro et al. Objective measurement of compliance during oral appliance  therapy for sleep-disordered breathing. Thorax 2013; 68(1): 91-96.  3. Edu, et al. Mandibular advancement devices in 620 men and women with RADAMES and snoring: tolerability and predictors of treatment success. Chest 2004; 125: 9497-7220.  4. Marta et al. Oral appliances for snoring and RADAMES: a review. Sleep 2006; 29: 244-262.  5. Cate et al. Oral appliance treatment for RADAMES: an update. J Clin Sleep Med 2014; 10(2): 215-227.  6. Judith et al. Predictors of OSAH treatment outcome. J Dent Res 2007; 86: 3077-2649.      Weight Loss:    Weight loss is a long-term strategy that may improve sleep apnea in some patients.    Weight management is a personal decision.  If you are interested in exploring weight loss strategies, the following discussion covers the impact on weight loss on sleep apnea and the approaches that may be successful.    Weight loss decreases severity of sleep apnea in most people with obesity. For those with mild obesity who have developed snoring with weight gain, even 15-30 pound weight loss can improve and occasionally eliminate sleep apnea.  Structured and life-long dietary and health habits are necessary to lose weight and keep healthier weight levels.     Though there may be significant health benefits from weight loss, long-term weight loss is very difficult to achieve- studies show success with dietary management in less than 10% of people. In addition, substantial weight loss may require years of dietary control and may be difficult if patients have severe obesity. In these cases, surgical management may be considered.  Finally, older individuals who have tolerated obesity without health complications may be less likely to benefit from weight loss strategies.      [unfilled]    Surgery:    Surgery for obstructive sleep apnea is considered generally only when other therapies fail to work. Surgery may be discussed with you if you are having a difficult time tolerating CPAP and or  when there is an abnormal structure that requires surgical correction.  Nose and throat surgeries often enlarge the airway to prevent collapse.  Most of these surgeries create pain for 1-2 weeks and up to half of the most common surgeries are not effective throughout life.  You should carefully discuss the benefits and drawbacks to surgery with your sleep provider and surgeon to determine if it is the best solution for you.   More information  Surgery for RADAMES is directed at areas that are responsible for narrowing or complete obstruction of the airway during sleep.  There are a wide range of procedures available to enlarge and/or stabilize the airway to prevent blockage of breathing in the three major areas where it can occur: the palate, tongue, and nasal regions.  Successful surgical treatment depends on the accurate identification of the factors responsible for obstructive sleep apnea in each person.  A personalized approach is required because there is no single treatment that works well for everyone.  Because of anatomic variation, consultation with an examination by a sleep surgeon is a critical first step in determining what surgical options are best for each patient.  In some cases, examination during sedation may be recommended in order to guide the selection of procedures.  Patients will be counseled about risks and benefits as well as the typical recovery course after surgery. Surgery is typically not a cure for a person s RADAMES.  However, surgery will often significantly improve one s RADAMES severity (termed  success rate ).  Even in the absence of a cure, surgery will decrease the cardiovascular risk associated with OSA7; improve overall quality of life8 (sleepiness, functionality, sleep quality, etc).      Palate Procedures:  Patients with RADAMES often have narrowing of their airway in the region of their tonsils and uvula.  The goals of palate procedures are to widen the airway in this region as well as to  help the tissues resist collapse.  Modern palate procedure techniques focus on tissue conservation and soft tissue rearrangement, rather than tissue removal.  Often the uvula is preserved in this procedure. Residual sleep apnea is common in patient after pharyngoplasty with an average reduction in sleep apnea events of 33%2.      Tongue Procedures:  ExamWhile patients are awake, the muscles that surround the throat are active and keep this region open for breathing. These muscles relax during sleep, allowing the tongue and other structures to collapse and block breathing.  There are several different tongue procedures available.  Selection of a tongue base procedure depends on characteristics seen on physical exam.  Generally, procedures are aimed at removing bulky tissues in this area or preventing the back of the tongue from falling back during sleep.  Success rates for tongue surgery range from 50-62%3.    Hypoglossal Nerve Stimulation:  Hypoglossal nerve stimulation has recently received approval from the [place] Food and Drug Administration for the treatment of obstructive sleep apnea.  This is based on research showing that the system was safe and effective in treating sleep apnea6.  Results showed that the median AHI score decreased 68%, from 29.3 to 9.0. This therapy uses an implant system that senses breathing patterns and delivers mild stimulation to airway muscles, which keeps the airway open during sleep.  The system consists of three fully implanted components: a small generator (similar in size to a pacemaker), a breathing sensor, and a stimulation lead.  Using a small handheld remote, a patient turns the therapy on before bed and off upon awakening.    Candidates for this device must be greater than 22 years of age, have moderate to severe RADAMES (AHI between 20-65), BMI less than 32, have tried CPAP/oral appliance without success, and have appropriate upper airway anatomy (determined by a sleep  endoscopy performed by Dr. Lim).    Hypoglossal Nerve Stimulation Pathway:    The sleep surgeon s office will work with the patient through the insurance prior-authorization process (including communications and appeals).    Nasal Procedures:  Nasal obstruction can interfere with nasal breathing during the day and night.  Studies have shown that relief of nasal obstruction can improve the ability of some patients to tolerate positive airway pressure therapy for obstructive sleep apnea1.  Treatment options include medications such as nasal saline, topical corticosteroid and antihistamine sprays, and oral medications such as antihistamines or decongestants. Non-surgical treatments can include external nasal dilators for selected patients. If these are not successful by themselves, surgery can improve the nasal airway either alone or in combination with these other options.      Combination Procedures:  Combination of surgical procedures and other treatments may be recommended, particularly if patients have more than one area of narrowing or persistent positional disease.  The success rate of combination surgery ranges from 66-80%2,3.    References  1. Anna NGUYEN. The Role of the Nose in Snoring and Obstructive Sleep Apnoea: An Update.  Eur Arch Otorhinolaryngol. 2011; 268: 1365-73.  2.  Fab SM; Alyce JA; Sara JR; Pallanch JF; Gregg MB; Alex SG; Glo WIN. Surgical modifications of the upper airway for obstructive sleep apnea in adults: a systematic review and meta-analysis. SLEEP 2010;33(10):8416-9230. Ayo ARRIAZA. Hypopharyngeal surgery in obstructive sleep apnea: an evidence-based medicine review.  Arch Otolaryngol Head Neck Surg. 2006 Feb;132(2):206-13.  3. Robert YH1, Alize Y, Arnaud YOSSI. The efficacy of anatomically based multilevel surgery for obstructive sleep apnea. Otolaryngol Head Neck Surg. 2003 Oct;129(4):327-35.  4. Ayo ARRIAZA, Goldberg A. Hypopharyngeal Surgery in Obstructive Sleep Apnea: An  Evidence-Based Medicine Review. Arch Otolaryngol Head Neck Surg. 2006 Feb;132(2):206-13.  5. Oleksandr NAVA et al. Upper-Airway Stimulation for Obstructive Sleep Apnea.  N Engl J Med. 2014 Jan 9;370(2):139-49.  6. Carlene Y et al. Increased Incidence of Cardiovascular Disease in Middle-aged Men with Obstructive Sleep Apnea. Am J Respir Crit Care Med; 2002 166: 159-165  7. Kassandra EM et al. Studying Life Effects and Effectiveness of Palatopharyngoplasty (SLEEP) study: Subjective Outcomes of Isolated Uvulopalatopharyngoplasty. Otolaryngol Head Neck Surg. 2011; 144: 623-631.    Your BMI is Body mass index is 31.26 kg/(m^2).  Weight management is a personal decision.  If you are interested in exploring weight loss strategies, the following discussion covers the approaches that may be successful. Body mass index (BMI) is one way to tell whether you are at a healthy weight, overweight, or obese. It measures your weight in relation to your height.  A BMI of 18.5 to 24.9 is in the healthy range. A person with a BMI of 25 to 29.9 is considered overweight, and someone with a BMI of 30 or greater is considered obese. More than two-thirds of American adults are considered overweight or obese.  Being overweight or obese increases the risk for further weight gain. Excess weight may lead to heart disease and diabetes.  Creating and following plans for healthy eating and physical activity may help you improve your health.  Weight control is part of healthy lifestyle and includes exercise, emotional health, and healthy eating habits. Careful eating habits lifelong are the mainstay of weight control. Though there are significant health benefits from weight loss, long-term weight loss with diet alone may be very difficult to achieve- studies show long-term success with dietary management in less than 10% of people. Attaining a healthy weight may be especially difficult to achieve in those with severe obesity. In some cases, medications,  devices and surgical management might be considered.  What can you do?  If you are overweight or obese and are interested in methods for weight loss, you should discuss this with your provider.     Consider reducing daily calorie intake by 500 calories.     Keep a food journal.     Avoiding skipping meals, consider cutting portions instead.    Diet combined with exercise helps maintain muscle while optimizing fat loss. Strength training is particularly important for building and maintaining muscle mass. Exercise helps reduce stress, increase energy, and improves fitness. Increasing exercise without diet control, however, may not burn enough calories to loose weight.       Start walking three days a week 10-20 minutes at a time    Work towards walking thirty minutes five days a week     Eventually, increase the speed of your walking for 1-2 minutes at time    In addition, we recommend that you review healthy lifestyles and methods for weight loss available through the National Institutes of Health patient information sites:  http://win.niddk.nih.gov/publications/index.htm    And look into health and wellness programs that may be available through your health insurance provider, employer, local community center, or tima club.    Weight management plan: Patient was referred to their PCP to discuss a diet and exercise plan.    Plan is sleep study, in lab, with breathing tests prior.

## 2017-12-22 NOTE — PROGRESS NOTES
DATE OF SERVICE:  2017      CHIEF COMPLAINT:  I have been asked to see Mr. Rashi Schroeder in consultation by provider Felicia Alas for a history of obstructive sleep apnea and narcolepsy while he is being evaluated for a liver transplant.      HISTORY OF PRESENT ILLNESS:  The patient reports a 40 pound weight loss recently with treatment for his fluid increase with his liver disease as well as a generalized weakness that has been ongoing and probably some loss of muscle.  He is anticipating needing to qualify for a liver transplant and to do so he will need a polysomnogram.  He has a history of obstructive sleep apnea and also of narcolepsy.  He reports that the narcolepsy did start when he was about age 8 or so in 6th grade that he would be playing and as he would get excited about something he would fall asleep.  He has been intolerant of stimulants as they made him edgy.  He states he tried Zoloft and is unaware of what side effects that did give him.  It was recommended that he consider Xyrem however, he is not wanting to consider a drug of that nature.  His most recent episodes of cataplexy were within the last few years.  Three years ago he was meeting in-laws for I believe his son's wedding and as he walked closer to them he lost ability to support himself and then did slump to the ground.  That episode probably lasted longer than a couple minutes.  He did not lose consciousness and continued to hear voices.  The other most recent episode occurred while going to his elderly brother's  after walking in had problems with collapsing.      His obstructive sleep apnea, of which he is unable to tell me the degree of severity has also been untreated.  He was studied twice, once at [facility] and another time at [facility] or [place] in [place] and was diagnosed in the .  Following that sleep study, he was provided with a box and he states that he may never have fallen asleep on therapy.  The CPAP  was especially noisy.  It had like a snout strap that came out from it and he was unable to maintain or initiate any significant sleep with it.  His current sleep schedule is a concern of his.  He does have a history of being a night owl and working night shift, and despite his excessive sleepiness, he is able to stay up until 10:00 or 11:00 p.m. most nights and doing some relaxing once he enters bed with TV, which he turns off at about 11:30 at night.  He may sleep then, which takes about 3 minutes to initiate and sleep until either 5:00 in the morning or as late as noon. Wakes up 6 times a night and it may just take him a minute to fall back asleep.  He is currently having some problems with diarrhea.  Total sleep time is somewhere around 8 hours on average.  Feels his best if he gets 6-8.  He does nap any time he sits down and relaxes as he will fall asleep.  He intentionally naps for about an hour or less 7 times a week.  Denies restless legs, has a history of some night terrors with some moaning while he has nightmares and that can occur 3-4 nights per week, but that has lessened recently.  Bed partner Carleen, who is present today at this visit, reports snoring that is at least louder than talking.  He does awaken at times with a gasp or a snort arousal.  He does continue to have pauses in his breathing, dry throat in the morning and trouble breathing through his nose.  Heartburn can be present as well.  He sleeps in all positions.  Most things that occur for him are worse on his back.  No signs of sleep paralysis or hypnagogic or hypnopompic hallucinations.      SOCIAL, PERSONAL AND FAMILY HISTORY:  He is  lives with his wife.  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  Sleep can be disrupted by his bed partner elbowing him due to snoring.  She also snores as well, unsure of family history.      SUBSTANCES THAT AFFECT SLEEP:  No marijuana or alcohol use, no caffeine use as well.   Eighteen out of 24 days he is exceptionally tired and sleepy.  He does have a past history of smoking cigarettes but quit about 20 years ago and had at least a 20-year pack history, having smoked 5 packs of cigarettes/day at period of time.       No problems with driving or near miss accidents or accidents due to sleepiness.  He reports sleepiness is affecting his quality of life with the high need for sleep and 30/30 days he is tired and fatigued and 3/30 days, states mental health is not good with these serious problems going on with his health.     Allergies:    Allergies   Allergen Reactions     Latex      Per patient: Unknown.       Medications:    Current Outpatient Prescriptions   Medication Sig Dispense Refill     acetaminophen 500 MG CAPS        atorvastatin (LIPITOR) 80 MG tablet TAKE 1 TABLET BY MOUTH AT BEDTIME.       blood glucose monitoring (INDIRA CONTOUR NEXT) test strip        budesonide-formoterol (SYMBICORT) 160-4.5 MCG/ACT Inhaler Inhale 1 puff into the lungs       dicyclomine (BENTYL) 10 MG capsule Take 10 mg by mouth       diphenoxylate-atropine (LOMOTIL) 2.5-0.025 MG per tablet        ferrous sulfate (IRON) 325 (65 FE) MG tablet Take 325 mg by mouth       metoprolol (TOPROL-XL) 25 MG 24 hr tablet        albuterol (PROAIR HFA/PROVENTIL HFA/VENTOLIN HFA) 108 (90 BASE) MCG/ACT Inhaler Inhale 2 puffs into the lungs       Lactobacillus (ACIDOPHILUS) CAPS        rifaximin (XIFAXAN) 550 MG TABS tablet Take 1 tablet (550 mg) by mouth 2 times daily 60 tablet 3     METOPROLOL TARTRATE PO Take 12.5 mg by mouth 2 times daily       loperamide (IMODIUM) 2 MG capsule Take 2 mg by mouth as needed for diarrhea       PANTOPRAZOLE SODIUM PO Take 40 mg by mouth every morning (before breakfast)         Problem List:  Patient Active Problem List    Diagnosis Date Noted     HUDSON (nonalcoholic steatohepatitis) 09/22/2017     Priority: Medium        Past Medical/Surgical History:  Past Medical History:   Diagnosis Date  "    CAD (coronary artery disease) stenting     MI (myocardial infarction) 2014     Narcolepsy      HUDSON (nonalcoholic steatohepatitis) 9/22/2017     RADAMES (obstructive sleep apnea)      Tubular adenoma 01/25/2017    4 mm, care everywhere. Due 2022     Urinary bladder neoplasm      History reviewed. No pertinent surgical history.        Physical Examination:  Vitals: BP 92/54  Pulse 70  Resp 16  Ht 1.803 m (5' 10.98\")  Wt 101.6 kg (224 lb)  SpO2 98%  BMI 31.26 kg/m2  BMI= Body mass index is 31.26 kg/(m^2).    Neck Cir (cm): 38 cm    [place] Total Score 12/21/2017   Total score - [place] 18       GENERAL APPEARANCE: alert, no distress and cooperative  EYES: Eyes grossly normal to inspection  HENT: oropharynx crowded, uvula elongated, soft palate dependent with redundant tissue, tongue base enlarged and nares with nasal valve collapse on L  NECK: thyroid normal to palpation  RESP: lungs clear to auscultation with exception of RLL, minimal breath sounds in RLL  CV: regular rates and rhythm, normal S1 S2, no S3 or S4 and 3/6 systolic murmur heard best in aortic region  Extremities are without clubbing, 2+ edema superior to knees bilaterally   Musculoskeletal:Moves without difficulty  Mallampati Class: IV.  Tonsillar Stage: 1  hidden by pillars.  Dental: Dentition in good condition.  Good advancement of lower jaw without tmd  Skin: without facial rash     REVIEW OF SYSTEMS:  A 14-point comprehensive review of systems was completed and negative with the exception of the following:   CONSTITUTIONAL:  Has had a weight loss, feels cold at night.   ALLERGY:  Latex.   EYES:  Some change in vision.   CARDIOVASCULAR:  Has a heart murmur.  Denies chest pain or pressure.  He can be short of breath when lying down, sometimes albuterol makes it better.  Has swelling in his lower extremities.  He also has a history of a stent.  He recently had a dobutamine stress echo.  He has a LVEF of 60% to 65% and with dobutamine EF increased " to greater than 70% with left ventricular cavity size decreasing appropriately; no wall motion abnormalities at rest or with dobutamine.  It was noted that there is mild aortic stenosis and mild mitral annular calcification.   NERVOUS SYSTEM:  Has weakness in both arms.   PULMONARY:  On his medical record a history of both asthma, COPD.  He denies ever having pulmonary function tests.  He has been prescribed Symbicort which he takes usually during seasonal allergy periods and on occasion tends to use albuterol as a rescue more frequently.  He is short of breath with activity.  He can only do about 1 flight of stairs without stopping, has difficulty with hills. Occasional wheeze.   DIGESTIVE:  Has been having watery diarrhea, fat or grease in stools.  Stools are bloody.   ENDOCRINE:  Type 2 diabetes.   MENTAL HEALTH:  Depression, anxiety and concerns about his general health.      ASSESSMENT AND PLAN:  It is my impression that Mr. Schroeder presents with a convincing set of symptoms for persistent obstructive sleep apnea.  He and his wife are both willing for him to undergo an in-lab polysomnogram.  Due to his sleep fragmentation and variable length of sleep, I felt that an in-lab study would be more beneficial due to the improved sensitivity of this testing (expecting more REM with his narcolepsy) and the following plan of care has been developed:   1.  Snoring with a past history of obstructive sleep apnea, intolerant of treatment.  I have ordered an in-lab study to occur after he has pulmonary function tests to see if any significant pulmonary problem does exist.  He is exceptionally deconditioned at this point in time.  We have discussed all modalities of treatment and he does seem open to considering any.  He does understand that treatment around a major surgery is important.   2.  Narcolepsy.  We will not be testing him for narcolepsy since he really does not wish to have any treatment; however, improvement in sleep  quality and quantity may be a significant factor in improving his excessive daytime sleepiness and may have some impact on his cataplexy with decrease pressure for REM.   3.  Generalized weakness.  He has had some falls, but nothing recent and none at night.  Pending his GI status he may benefit from a bedside commode and instruction on how to disconnect so that he does not get tangled up in wires and certainly padding on the bed.   4.  Pulmonary:  Shortness of breath.  PFTs since he has a relatively normal echo and will follow these results in case there should be any need to change my orders.      Thank you for allowing me to participate in this kind man's care.      Time spent with patient 60 minutes, of which greater than 50% was spent in counseling, education and coordination of care.         CHLOE HER, CNP             D: 2017 13:00   T: 2017 06:38   MT: JENS      Name:     LESLIE GALDAMEZ   MRN:      [id number]        Account:      [id number]   :      1950           Visit Date:   2017      Document: [id number]

## 2017-12-29 NOTE — TELEPHONE ENCOUNTER
Spoke with Carleen to review transplant evaluation.  Plan- PFT's when returning 2/2018 (order sent). Echo in June with next follow up here. Communication to ALEX Ruvalcaba regarding sleep study arrangements/plan. Urology wants annual cysto (due 12/18).    Will review at next selection meeting.

## 2018-01-01 ENCOUNTER — COMMITTEE REVIEW (OUTPATIENT)
Dept: TRANSPLANT | Facility: CLINIC | Age: 68
End: 2018-01-01

## 2018-01-01 ENCOUNTER — TELEPHONE (OUTPATIENT)
Dept: SLEEP MEDICINE | Facility: CLINIC | Age: 68
End: 2018-01-01

## 2018-01-01 ENCOUNTER — TELEPHONE (OUTPATIENT)
Dept: TRANSPLANT | Facility: CLINIC | Age: 68
End: 2018-01-01

## 2018-01-01 ENCOUNTER — VIRTUAL VISIT (OUTPATIENT)
Dept: SLEEP MEDICINE | Facility: CLINIC | Age: 68
End: 2018-01-01
Payer: COMMERCIAL

## 2018-01-01 ENCOUNTER — OFFICE VISIT (OUTPATIENT)
Dept: GASTROENTEROLOGY | Facility: CLINIC | Age: 68
End: 2018-01-01
Attending: INTERNAL MEDICINE
Payer: COMMERCIAL

## 2018-01-01 VITALS
RESPIRATION RATE: 16 BRPM | HEART RATE: 77 BPM | SYSTOLIC BLOOD PRESSURE: 116 MMHG | WEIGHT: 188.9 LBS | HEIGHT: 71 IN | BODY MASS INDEX: 26.44 KG/M2 | TEMPERATURE: 98.2 F | OXYGEN SATURATION: 98 % | DIASTOLIC BLOOD PRESSURE: 71 MMHG

## 2018-01-01 DIAGNOSIS — K74.60 CIRRHOSIS OF LIVER WITH ASCITES, UNSPECIFIED HEPATIC CIRRHOSIS TYPE (H): ICD-10-CM

## 2018-01-01 DIAGNOSIS — K75.81 NASH (NONALCOHOLIC STEATOHEPATITIS): ICD-10-CM

## 2018-01-01 DIAGNOSIS — K75.81 LIVER CIRRHOSIS SECONDARY TO NASH (H): Primary | ICD-10-CM

## 2018-01-01 DIAGNOSIS — E55.9 VITAMIN D DEFICIENCY: ICD-10-CM

## 2018-01-01 DIAGNOSIS — G47.33 OSA (OBSTRUCTIVE SLEEP APNEA): Primary | ICD-10-CM

## 2018-01-01 DIAGNOSIS — K74.60 CIRRHOSIS OF LIVER WITH ASCITES, UNSPECIFIED HEPATIC CIRRHOSIS TYPE (H): Primary | ICD-10-CM

## 2018-01-01 DIAGNOSIS — E55.9 VITAMIN D DEFICIENCY: Primary | ICD-10-CM

## 2018-01-01 DIAGNOSIS — R18.8 CIRRHOSIS OF LIVER WITH ASCITES, UNSPECIFIED HEPATIC CIRRHOSIS TYPE (H): ICD-10-CM

## 2018-01-01 DIAGNOSIS — R18.8 CIRRHOSIS OF LIVER WITH ASCITES, UNSPECIFIED HEPATIC CIRRHOSIS TYPE (H): Primary | ICD-10-CM

## 2018-01-01 DIAGNOSIS — K74.60 LIVER CIRRHOSIS SECONDARY TO NASH (H): Primary | ICD-10-CM

## 2018-01-01 DIAGNOSIS — K74.60 CIRRHOSIS OF LIVER (H): ICD-10-CM

## 2018-01-01 DIAGNOSIS — K76.82 HEPATIC ENCEPHALOPATHY (H): ICD-10-CM

## 2018-01-01 LAB
ALBUMIN SERPL-MCNC: 2.2 G/DL (ref 3.4–5)
ALP SERPL-CCNC: 129 U/L (ref 40–150)
ALT SERPL W P-5'-P-CCNC: 28 U/L (ref 0–70)
ANION GAP SERPL CALCULATED.3IONS-SCNC: 8 MMOL/L (ref 3–14)
AST SERPL W P-5'-P-CCNC: 34 U/L (ref 0–45)
BILIRUB DIRECT SERPL-MCNC: 0.9 MG/DL (ref 0–0.2)
BILIRUB SERPL-MCNC: 3.4 MG/DL (ref 0.2–1.3)
BUN SERPL-MCNC: 15 MG/DL (ref 7–30)
CALCIUM SERPL-MCNC: 8 MG/DL (ref 8.5–10.1)
CHLORIDE SERPL-SCNC: 107 MMOL/L (ref 94–109)
CO2 SERPL-SCNC: 24 MMOL/L (ref 20–32)
COPATH REPORT: NORMAL
CREAT SERPL-MCNC: 1 MG/DL (ref 0.66–1.25)
DEPRECATED CALCIDIOL+CALCIFEROL SERPL-MC: 17 UG/L (ref 20–75)
ERYTHROCYTE [DISTWIDTH] IN BLOOD BY AUTOMATED COUNT: 17.1 % (ref 10–15)
GFR SERPL CREATININE-BSD FRML MDRD: 74 ML/MIN/1.7M2
GLUCOSE SERPL-MCNC: 140 MG/DL (ref 70–99)
HCT VFR BLD AUTO: 29.4 % (ref 40–53)
HGB BLD-MCNC: 9.6 G/DL (ref 13.3–17.7)
INR PPP: 1.67 (ref 0.86–1.14)
M TB TUBERC IFN-G BLD QL: ABNORMAL
M TB TUBERC IFN-G/MITOGEN IGNF BLD: 0 IU/ML
MCH RBC QN AUTO: 33.6 PG (ref 26.5–33)
MCHC RBC AUTO-ENTMCNC: 32.7 G/DL (ref 31.5–36.5)
MCV RBC AUTO: 103 FL (ref 78–100)
PLATELET # BLD AUTO: 56 10E9/L (ref 150–450)
POTASSIUM SERPL-SCNC: 3.6 MMOL/L (ref 3.4–5.3)
PROT SERPL-MCNC: 6.7 G/DL (ref 6.8–8.8)
RBC # BLD AUTO: 2.86 10E12/L (ref 4.4–5.9)
SODIUM SERPL-SCNC: 138 MMOL/L (ref 133–144)
WBC # BLD AUTO: 4 10E9/L (ref 4–11)

## 2018-01-01 PROCEDURE — 80048 BASIC METABOLIC PNL TOTAL CA: CPT | Performed by: INTERNAL MEDICINE

## 2018-01-01 PROCEDURE — 85610 PROTHROMBIN TIME: CPT | Performed by: INTERNAL MEDICINE

## 2018-01-01 PROCEDURE — 80076 HEPATIC FUNCTION PANEL: CPT | Performed by: INTERNAL MEDICINE

## 2018-01-01 PROCEDURE — 85027 COMPLETE CBC AUTOMATED: CPT | Performed by: INTERNAL MEDICINE

## 2018-01-01 PROCEDURE — 86480 TB TEST CELL IMMUN MEASURE: CPT | Performed by: INTERNAL MEDICINE

## 2018-01-01 PROCEDURE — G0463 HOSPITAL OUTPT CLINIC VISIT: HCPCS | Mod: ZF

## 2018-01-01 PROCEDURE — 82306 VITAMIN D 25 HYDROXY: CPT | Performed by: INTERNAL MEDICINE

## 2018-01-01 PROCEDURE — 98967 PH1 ASSMT&MGMT NQHP 11-20: CPT | Performed by: NURSE PRACTITIONER

## 2018-01-01 PROCEDURE — 36415 COLL VENOUS BLD VENIPUNCTURE: CPT | Performed by: INTERNAL MEDICINE

## 2018-01-01 RX ORDER — ERGOCALCIFEROL 1.25 MG/1
50000 CAPSULE, LIQUID FILLED ORAL WEEKLY
Qty: 8 CAPSULE | Refills: 0 | Status: SHIPPED | OUTPATIENT
Start: 2018-01-01 | End: 2018-01-01

## 2018-01-01 RX ORDER — ERGOCALCIFEROL 1.25 MG/1
50000 CAPSULE, LIQUID FILLED ORAL DAILY
COMMUNITY
Start: 2018-01-01 | End: 2018-01-01

## 2018-01-01 ASSESSMENT — PATIENT HEALTH QUESTIONNAIRE - PHQ9: SUM OF ALL RESPONSES TO PHQ QUESTIONS 1-9: 21

## 2018-01-01 ASSESSMENT — PAIN SCALES - GENERAL: PAINLEVEL: NO PAIN (0)

## 2018-01-02 NOTE — COMMITTEE REVIEW
Abdominal Committee Review Note     Evaluation Date: 10/17/2017  Committee Review Date: 1/2/2018    Organ being evaluated for: Liver    Transplant Phase: Evaluation  Transplant Status: Active    Transplant Coordinator: Caitlyn Wang  Transplant Surgeon:   Jordon White    Referring Physician:     Primary Diagnosis: Cirrhosis: Fatty Liver ([place])  Secondary Diagnosis:     Committee Review Members:  Nutrition Magi Baez, RD   Pharmacy Rafael Sims, [facility]    - Clinical Brodie Lopez, [facility]   Transplant Felicia Alas MD, Maxine Henderson MD, Dany Hou MD, Jr Abhilash Sharpe RN, Lillian Dickerson, APRN CNP, Caitlyn Wang, YANET, Dhara Gomez MD, Juancarlos Serrano MD, Kia Garcia RN, Jordon White MD, Shailesh Gipson MD   Transplant Surgery Otto Waldrop MD       Transplant Eligibility: [place]    Committee Review Decision: Approved    Relative Contraindications: None    Absolute Contraindications: None    Committee Chair Dany Hou MD verbally attested to the committee's decision.    Committee Discussion Details:    List pending evaluation

## 2018-01-03 NOTE — TELEPHONE ENCOUNTER
Pt called and appointment has been made for Sleep Study.  Pt lives in [place] and will try to get PFT testing done there due to distance.  Pt will bring PFT testing results to clinic the night of Sleep Study.      Carleen will back with with the fax number of where PFT order will need to go.      Sleep Study Packet mailed to pt.      ANAYA Motta

## 2018-01-25 NOTE — PROGRESS NOTES
"Chief Complaint   Patient presents with     RECHECK       Initial There were no vitals taken for this visit. Estimated body mass index is 30.93 kg/(m^2) as calculated from the following:    Height as of 12/21/17: 1.803 m (5' 11\").    Weight as of 12/21/17: 100.6 kg (221 lb 12.8 oz).  Medication Reconciliation: unable or not appropriate to perform     ANAYA Motta        "

## 2018-01-25 NOTE — MR AVS SNAPSHOT
After Visit Summary   1/25/2018    Rashi Schroeder    MRN: [id number]           Patient Information     Date Of Birth          1950        Visit Information        Provider Department      1/25/2018 4:00 PM Joann Ruvalcaba APRN CNP [facility]        Today's Diagnoses     RADAMES (obstructive sleep apnea)    -  1       Follow-ups after your visit        Your next 10 appointments already scheduled     Feb 12, 2018  8:30 AM CST   Lab with  LAB   [facility] ([facility])    [address]  1st Floor  [facility] [address] [phone]            Feb 12, 2018  9:20 AM CST   (Arrive by 9:05 AM)   Return Liver Transplant with Felicia Alas MD   [facility]at[facility] ([facility])    [address]  [address]  [place] [place] [facility] [address] [phone]              Who to contact     If you have questions or need follow up information about today's clinic visit or your schedule please contact [facility] directly at [phone].  Normal or non-critical lab and imaging results will be communicated to you by OpenFeinthart, letter or phone within 4 business days after the clinic has received the results. If you do not hear from us within 7 days, please contact the clinic through Paylocityt or phone. If you have a critical or abnormal lab result, we will notify you by phone as soon as possible.  Submit refill requests through UrgentRx or call your pharmacy and they will forward the refill request to us. Please allow 3 business days for your refill to be completed.          Additional Information About Your Visit        OpenFeintharMobiCart Information     UrgentRx gives you secure access to your electronic health record. If you see a primary care provider, you can also send messages to your care team and make appointments. If you have questions, please call your primary care clinic.  If you do not have a primary care provider,  please call [phone] and they will assist you.        Care EveryWhere ID     This is your Care EveryWhere ID. This could be used by other organizations to access your [place] medical records  [id number]         Blood Pressure from Last 3 Encounters:   12/21/17 118/63   12/21/17 120/71   12/21/17 92/54    Weight from Last 3 Encounters:   12/21/17 100.6 kg (221 lb 12.8 oz)   12/21/17 101.6 kg (224 lb)   10/17/17 91.4 kg (201 lb 8 oz)              Today, you had the following     No orders found for display       Primary Care Provider Office Phone # Fax #    KyleVARSHA Armstrong [phone]       [facility] [address] [place] [address]        Equal Access to Services     [facility]: Hadii aad ku hadasho Soomaali, waaxda luqadaha, qaybta kaalmada adeegyada, sterling escobar . So [facility] [phone].    ATENCIÓN: Si habla español, tiene a delgado disposición servicios gratuitos de asistencia lingüística. Paulie al [phone].    We comply with applicable federal civil rights laws and [place] laws. We do not discriminate on the basis of race, color, national origin, age, disability, sex, sexual orientation, or gender identity.            Thank you!     Thank you for choosing [facility], SLEEP STUDY  for your care. Our goal is always to provide you with excellent care. Hearing back from our patients is one way we can continue to improve our services. Please take a few minutes to complete the written survey that you may receive in the mail after your visit with us. Thank you!             Your Updated Medication List - Protect others around you: Learn how to safely use, store and throw away your medicines at www.disposemymeds.org.          This list is accurate as of 1/25/18  5:35 PM.  Always use your most recent med list.                   Brand Name Dispense Instructions for use Diagnosis    acetaminophen 500 MG Caps           acidophilus Caps            albuterol 108 (90 BASE) MCG/ACT Inhaler    PROAIR HFA/PROVENTIL HFA/VENTOLIN HFA     Inhale 2 puffs into the lungs        atorvastatin 80 MG tablet    LIPITOR     TAKE 1 TABLET BY MOUTH AT BEDTIME.        INDIRA CONTOUR NEXT test strip   Generic drug:  blood glucose monitoring           budesonide-formoterol 160-4.5 MCG/ACT Inhaler    SYMBICORT     Inhale 1 puff into the lungs        dicyclomine 10 MG capsule    BENTYL     Take 10 mg by mouth        diphenoxylate-atropine 2.5-0.025 MG per tablet    LOMOTIL          ferrous sulfate 325 (65 FE) MG tablet    IRON     Take 325 mg by mouth        loperamide 2 MG capsule    IMODIUM     Take 2 mg by mouth as needed for diarrhea        metoprolol succinate 25 MG 24 hr tablet    TOPROL-XL          METOPROLOL TARTRATE PO      Take 12.5 mg by mouth 2 times daily        PANTOPRAZOLE SODIUM PO      Take 40 mg by mouth every morning (before breakfast)        rifaximin 550 MG Tabs tablet    XIFAXAN    60 tablet    Take 1 tablet (550 mg) by mouth 2 times daily    Hepatic encephalopathy (H)       vitamin D 76648 UNIT capsule    ERGOCALCIFEROL    8 capsule    Take 1 capsule (50,000 Units) by mouth once a week for 8 doses    Vitamin D deficiency, HUDSON (nonalcoholic steatohepatitis)

## 2018-01-25 NOTE — PROGRESS NOTES
Spoke with pt.  He's uncertain that he'll be moving forward with a transplant after a discussion with Dr. Alas.  He will be having a further conversation with Dr. Alas on 2/12/18.  He still remains interested, but needs more information.    With regard to performing a sleep study: he really does not want a repeat sleep study.  He states he was given 3 mask, he's too restless with his sleep that he couldn't tolerate the thing on his face.    Dr. Shelton at (now) [facility] states he'd qualify for a dental appliance which may help reduce apneic events, hypoxemia if present.     Assessment/Plan:   1. RADAMES: We need a copy of his several studies done in the last 10-20 years with Dr. Shelton's office at [address] [address], and now [facility]. I think at least we could benefit from an overnight oximetry at present to see if nocturnal hypoxemia and significant apnea is a current issue.  If it is, and he wishes to proceed with being listed for transplant, it is likely Dr. Alas would like tx.  Will route note to him to get his impression.       Time spent with patient is 15 minutes for a scheduled phone call.

## 2018-02-12 NOTE — TELEPHONE ENCOUNTER
Carleen called and Migue came down with GI upset, N/V last night. Will re schedule echo and Dr. Alas follow up in next 4-6 weeks.

## 2018-02-14 NOTE — TELEPHONE ENCOUNTER
February 14, 2018: I spoke with Rashi's wife; she will bring him on 3/20/18 to do labs and see Dr. Alas. She asked if she can have him do the echocardiogram locally. I assured her that I would ask his coordinator and call her back when I know.    In the meantime, I added an [place] onto [place]ts [place] [facility]    Parris Santiago  Pre-Liver Transplant   [phone]

## 2018-02-14 NOTE — TELEPHONE ENCOUNTER
February 14, 2018: I left a message for the patient's wife. Rashi Brady's coordinator, prefers that he have the echo done here. I will mail his schedule.    Parris Santiago  Pre-Liver Transplant   [phone]

## 2018-02-14 NOTE — LETTER
February 14, 2018    PRE-LIVER TRANSPLANT APPOINTMENT SCHEDULE    Patient:   Rashi Schroeder  MR#:    [id number]  Coordinator:          Caitlyn Wang  [phone]  :     Parris GAMEZ   [phone]  Location[facility]    [facility]  Date:    March 20, 2018      Day/Date: Tuesday, March 20, 2018  Time Location Activity   8:00 am Imaging and Lab testing  (1st floor [facility],  [address] [place] [address]; [facility] [address]) Blood tests    8:30 am Medicine Specialties  (3rd floor [facility]) Appointment with Dr. Alas,  Hepatologist   9:30 am [facility]  (3rd floor [facility]) Echocardiogram       Day/Date: Every Thursday *OPTIONAL*  Time Location Activity   12:00 pm to 1:30 pm Liver Transplant Support Group  [address]  [address]  Room 7-120 Every Thursday *OPTIONAL*     * IF ONLINE CHECK IN IS NOT DONE, YOU WILL NEED TO CHECK IN AT LEAST 15 MINUTES EARLIER THAN THE FIRST SCHEDULED APPOINTMENT *

## 2018-03-20 NOTE — NURSING NOTE
"Chief Complaint   Patient presents with     RECHECK     HUDSON       Initial /71  Pulse 77  Temp 98.2  F (36.8  C) (Oral)  Resp 16  Ht 1.803 m (5' 11\")  Wt 85.7 kg (188 lb 14.4 oz)  SpO2 98%  BMI 26.35 kg/m2 Estimated body mass index is 26.35 kg/(m^2) as calculated from the following:    Height as of this encounter: 1.803 m (5' 11\").    Weight as of this encounter: 85.7 kg (188 lb 14.4 oz).  Medication Reconciliation: complete     Cassie Perez [facility]  3/20/2018 8:40 AM        "

## 2018-03-20 NOTE — PROGRESS NOTES
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    He is currently undergoing evaluation for liver transplantation, was previously declined for listing by [facility] in [place].    Overall he remains decompensated.  Meld sodium 17, blood type O    Since we last met he continued to lose weight, appears more frail, and despite this his meld is barely above 15.  I had an honest and open discussion with Migue and his family regarding his odds of receiving liver transplantation under the circumstances.  My concern is he continues to decline clinically we are realistic chances of transplantation.  In addition his age and frailty and significant level of risk for such procedure.    Major complicating factor is his chronic diarrhea which appears to have been evaluated extensively in the past.  - I suggest he increase his Imodium dose.  - If no improvement may consider empiric pancreatic enzymes vs considering a very low dose prednisone, although with his episode of bacteremia this remains a risky approach.    If he is not able to achieve some improvement in frailty, or or stabilization of the weight loss despite the absence of clear cause besides the diarrhea, then I would not advocate for liver transplantation.    RTC in 2-3 months or sooner as needed    Felicia Alas MD  [facility] Transplant Hepatology clinic    -----------------------------------------------------       HPI:   Rashi Schroeder is a 67 year old male with biopsy-proven Burgos cirrhosis, with decompensated disease,  currently undergoing evaluation for liver transplantation.  He presents with his wife and daughter-in-law for follow-up.    Please refer to prior clinic note for details of initial presentation.   Since last visit, he continued to gradually decline clinically.  Reports to have been sick last month, with GI illness specifically nausea and vomiting.  Is not clear if that was an infectious process.  This contributed to ongoing weight loss, about 10 pounds in the last month and 40-50 pounds in the last 1-2 years.  Last week he had an episode of confusion, his wife felt it was related to dehydration.    He required paracentesis about 2 weeks ago, has lower extremity edema.  Has been taking half a tablet of rifaximin twice daily due to expense.  Has persistent chronic diarrhea.    Otherwise, denies chest pain, shortness of breath, abdominal pain, fevers, chills, bleeding, jaundice, falls, rash, pruritis. Has stable appetite and bowel habits.    Past Medical History:   Diagnosis Date     CAD (coronary artery disease) stenting     MI (myocardial infarction) 2014     Mild aortic stenosis 12/2017    echo q 6 months assessment of aortic valve gradient and PA pressures      Narcolepsy      HUDSON (nonalcoholic steatohepatitis) 9/22/2017     RADAMES (obstructive sleep apnea)      Osteoporosis 12/2017    dexa     Tubular adenoma 01/25/2017    4 mm, care everywhere. Due 2022     Urinary bladder neoplasm             Past Surgical History:   No past surgical history on file.         Medications:     Current Outpatient Prescriptions   Medication     acetaminophen 500 MG CAPS     atorvastatin (LIPITOR) 80 MG tablet     budesonide-formoterol (SYMBICORT) 160-4.5 MCG/ACT Inhaler     dicyclomine (BENTYL) 10 MG capsule     diphenoxylate-atropine (LOMOTIL) 2.5-0.025 MG per tablet     ferrous sulfate (IRON) 325 (65 FE) MG tablet     albuterol (PROAIR HFA/PROVENTIL HFA/VENTOLIN HFA) 108 (90 BASE) MCG/ACT Inhaler     Lactobacillus (ACIDOPHILUS) CAPS  "    rifaximin (XIFAXAN) 550 MG TABS tablet     METOPROLOL TARTRATE PO     loperamide (IMODIUM) 2 MG capsule     PANTOPRAZOLE SODIUM PO     vitamin D (ERGOCALCIFEROL) 11948 UNIT capsule     No current facility-administered medications for this visit.             Allergies:     Allergies   Allergen Reactions     Latex      Per patient: Unknown.            Social History:     Social History     Social History     Marital status:      Spouse name: N/A     Number of children: N/A     Years of education: N/A     Occupational History     Not on file.     Social History Main Topics     Smoking status: Former Smoker     Smokeless tobacco: Never Used      Comment: quit 26 years ago     Alcohol use No     Drug use: No     Sexual activity: Not on file     Other Topics Concern     Not on file     Social History Narrative            Family History:   Negative for chronic liver disease          Review of Systems:   10 points ROS was obtained and highlighted in the HPI, otherwise negative.          Physical Exam:   VS:  /71  Pulse 77  Temp 98.2  F (36.8  C) (Oral)  Resp 16  Ht 1.803 m (5' 11\")  Wt 85.7 kg (188 lb 14.4 oz)  SpO2 98%  BMI 26.35 kg/m2    Chronically ill appearin  Gen: A&Ox3, NAD  HEENT: icteric , oropharynx clear  CV: RRR  Lung: bibasilar crackles  Abd: soft, NT, ND,  spleen is enlarged, liver is not palpable.   Ext: trace edema, intact pulses.   Skin: stigmata of chronic liver disease.   Neuro: no focal deficits, grossly intact, no asterixis   Psych: appropriate mood and affects         Data:   Reviewed in person and significant for:  Lab Results   Component Value Date    WBC 4.0 03/20/2018     Lab Results   Component Value Date    RBC 2.86 03/20/2018     Lab Results   Component Value Date    HGB 9.6 03/20/2018     Lab Results   Component Value Date    HCT 29.4 03/20/2018     No components found for: MCT  Lab Results   Component Value Date     03/20/2018     Lab Results   Component Value " Date    MCH 33.6 03/20/2018     Lab Results   Component Value Date    MCHC 32.7 03/20/2018     Lab Results   Component Value Date    RDW 17.1 03/20/2018     Lab Results   Component Value Date    PLT 56 03/20/2018     Last Basic Metabolic Panel:  Lab Results   Component Value Date     03/20/2018      Lab Results   Component Value Date    POTASSIUM 3.6 03/20/2018     Lab Results   Component Value Date    CHLORIDE 107 03/20/2018     Lab Results   Component Value Date    MARY 8.0 03/20/2018     Lab Results   Component Value Date    CO2 24 03/20/2018     Lab Results   Component Value Date    BUN 15 03/20/2018     Lab Results   Component Value Date    CR 1.00 03/20/2018     Lab Results   Component Value Date     03/20/2018     Liver Function Studies -   Recent Labs   Lab Test  03/20/18   0807   PROTTOTAL  6.7*   ALBUMIN  2.2*   BILITOTAL  3.4*   ALKPHOS  129   AST  34   ALT  28

## 2018-03-20 NOTE — NURSING NOTE
Pt scored 21 on depression screen, pt declined speaking with nurse or behavioral health, provider notified.     Cassie Perez [facility]  3/20/2018 8:51 AM

## 2018-03-20 NOTE — MR AVS SNAPSHOT
After Visit Summary   3/20/2018    Rashi Schroeder    MRN: [id number]           Patient Information     Date Of Birth          1950        Visit Information        Provider Department      3/20/2018 8:30 AM Felicia Alas MD [facility] Hepatology        Today's Diagnoses     Liver cirrhosis secondary to HUDSON (H)    -  1       Follow-ups after your visit        Your next 10 appointments already scheduled     Jun 26, 2018  9:30 AM CDT   Lab with  LAB   [facility] Lab ([facility])    [address]  [address]  [place] [address] [phone]            Jun 26, 2018 10:30 AM CDT   (Arrive by 10:15 AM)   Return Liver Transplant with Felicia Alas MD   [facility] Hepatology ([facility])    [address]  [address]  [place] [address] [phone]              Who to contact     If you have questions or need follow up information about today's clinic visit or your schedule please contact [facility] HEPATOLOGY directly at [phone].  Normal or non-critical lab and imaging results will be communicated to you by "[employer]hart, letter or phone within 4 business days after the clinic has received the results. If you do not hear from us within 7 days, please contact the clinic through [employer] or phone. If you have a critical or abnormal lab result, we will notify you by phone as soon as possible.  Submit refill requests through [employer] or call your pharmacy and they will forward the refill request to us. Please allow 3 business days for your refill to be completed.          Additional Information About Your Visit        "[employer]hart Information     [employer] gives you secure access to your electronic health record. If you see a primary care provider, you can also send messages to your care team and make appointments. If you have questions, please call your primary care clinic.  If you do not have a primary care provider, please call [phone]  "and they will assist you.        Care EveryWhere ID     This is your Care EveryWhere ID. This could be used by other organizations to access your [place] medical records  [id number]        Your Vitals Were     Pulse Temperature Respirations Height Pulse Oximetry BMI (Body Mass Index)    77 98.2  F (36.8  C) (Oral) 16 1.803 m (5' 11\") 98% 26.35 kg/m2       Blood Pressure from Last 3 Encounters:   03/20/18 116/71   12/21/17 118/63   12/21/17 120/71    Weight from Last 3 Encounters:   03/20/18 85.7 kg (188 lb 14.4 oz)   12/21/17 100.6 kg (221 lb 12.8 oz)   12/21/17 101.6 kg (224 lb)              Today, you had the following     No orders found for display       Primary Care Provider Office Phone # Fax #    Gume Armstrong [phone]       [facility] [address] [place] [address]        Equal Access to Services     [facility]: Hadii ranjan ku hadasho Soomaali, waaxda luqadaha, qaybta kaalmada adeegyada, waxay heikein hayjustine escobar . So [facility] [phone].    ATENCIÓN: Si habla español, tiene a delgado disposición servicios gratuitos de asistencia lingüística. Ll[facility] [phone].    We comply with applicable federal civil rights laws and [place] laws. We do not discriminate on the basis of race, color, national origin, age, disability, sex, sexual orientation, or gender identity.            Thank you!     Thank you for choosing [facility] HEPATOLOGY  for your care. Our goal is always to provide you with excellent care. Hearing back from our patients is one way we can continue to improve our services. Please take a few minutes to complete the written survey that you may receive in the mail after your visit with us. Thank you!             Your Updated Medication List - Protect others around you: Learn how to safely use, store and throw away your medicines at www.disposemymeds.org.          This list is accurate as of 3/20/18 11:59 PM.  Always use your most recent med list.    "                Brand Name Dispense Instructions for use Diagnosis    acetaminophen 500 MG Caps           acidophilus Caps           albuterol 108 (90 BASE) MCG/ACT Inhaler    PROAIR HFA/PROVENTIL HFA/VENTOLIN HFA     Inhale 2 puffs into the lungs        atorvastatin 80 MG tablet    LIPITOR     TAKE 1 TABLET BY MOUTH AT BEDTIME.        budesonide-formoterol 160-4.5 MCG/ACT Inhaler    SYMBICORT     Inhale 1 puff into the lungs        dicyclomine 10 MG capsule    BENTYL     Take 10 mg by mouth        diphenoxylate-atropine 2.5-0.025 MG per tablet    LOMOTIL          ferrous sulfate 325 (65 FE) MG tablet    IRON     Take 325 mg by mouth daily        loperamide 2 MG capsule    IMODIUM     Take 2 mg by mouth as needed for diarrhea        METOPROLOL TARTRATE PO      Take 12.5 mg by mouth 2 times daily        PANTOPRAZOLE SODIUM PO      Take 40 mg by mouth every morning (before breakfast)        rifaximin 550 MG Tabs tablet    XIFAXAN    60 tablet    Take 1 tablet (550 mg) by mouth 2 times daily    Hepatic encephalopathy (H)

## 2018-05-14 NOTE — TELEPHONE ENCOUNTER
Request for rifaximin refill. Reviewed the need for infectious disease with Carleen and she feels Migue may not be proceeding with transplant due to his frailty.  Understands it is part of the evaluation and will defer at this time.

## (undated) RX ORDER — DOBUTAMINE HYDROCHLORIDE 200 MG/100ML
INJECTION INTRAVENOUS
Status: DISPENSED
Start: 2017-01-01

## (undated) RX ORDER — METOPROLOL TARTRATE 1 MG/ML
INJECTION, SOLUTION INTRAVENOUS
Status: DISPENSED
Start: 2017-01-01